# Patient Record
Sex: FEMALE | Race: WHITE | NOT HISPANIC OR LATINO | Employment: OTHER | URBAN - METROPOLITAN AREA
[De-identification: names, ages, dates, MRNs, and addresses within clinical notes are randomized per-mention and may not be internally consistent; named-entity substitution may affect disease eponyms.]

---

## 2017-05-19 ENCOUNTER — COMMUNICATION - HEALTHEAST (OUTPATIENT)
Dept: CARDIOLOGY | Facility: CLINIC | Age: 77
End: 2017-05-19

## 2017-05-19 DIAGNOSIS — I48.0 PAROXYSMAL ATRIAL FIBRILLATION (H): ICD-10-CM

## 2017-05-31 ENCOUNTER — OFFICE VISIT - HEALTHEAST (OUTPATIENT)
Dept: FAMILY MEDICINE | Facility: CLINIC | Age: 77
End: 2017-05-31

## 2017-05-31 DIAGNOSIS — E78.5 HYPERLIPIDEMIA: ICD-10-CM

## 2017-05-31 DIAGNOSIS — M81.0 SENILE OSTEOPOROSIS: ICD-10-CM

## 2017-05-31 DIAGNOSIS — R73.01 IMPAIRED FASTING GLUCOSE: ICD-10-CM

## 2017-05-31 DIAGNOSIS — F41.1 ANXIETY STATE: ICD-10-CM

## 2017-05-31 DIAGNOSIS — Z00.00 MEDICARE ANNUAL WELLNESS VISIT, INITIAL: ICD-10-CM

## 2017-05-31 DIAGNOSIS — I48.0 PAROXYSMAL ATRIAL FIBRILLATION (H): ICD-10-CM

## 2017-05-31 LAB
CHOLEST SERPL-MCNC: 218 MG/DL
FASTING STATUS PATIENT QL REPORTED: YES
HBA1C MFR BLD: 5.4 % (ref 3.5–6)
HDLC SERPL-MCNC: 64 MG/DL
LDLC SERPL CALC-MCNC: 138 MG/DL
TRIGL SERPL-MCNC: 78 MG/DL

## 2017-05-31 ASSESSMENT — MIFFLIN-ST. JEOR: SCORE: 1197.89

## 2017-06-11 ENCOUNTER — COMMUNICATION - HEALTHEAST (OUTPATIENT)
Dept: FAMILY MEDICINE | Facility: CLINIC | Age: 77
End: 2017-06-11

## 2017-06-12 ENCOUNTER — COMMUNICATION - HEALTHEAST (OUTPATIENT)
Dept: FAMILY MEDICINE | Facility: CLINIC | Age: 77
End: 2017-06-12

## 2017-06-12 DIAGNOSIS — M81.0 OSTEOPOROSIS, UNSPECIFIED: ICD-10-CM

## 2017-06-14 ENCOUNTER — AMBULATORY - HEALTHEAST (OUTPATIENT)
Dept: FAMILY MEDICINE | Facility: CLINIC | Age: 77
End: 2017-06-14

## 2017-08-01 ENCOUNTER — HOSPITAL ENCOUNTER (OUTPATIENT)
Dept: MAMMOGRAPHY | Facility: CLINIC | Age: 77
Discharge: HOME | End: 2017-08-01
Attending: FAMILY MEDICINE

## 2017-08-01 DIAGNOSIS — Z12.31 VISIT FOR SCREENING MAMMOGRAM: ICD-10-CM

## 2017-09-19 ENCOUNTER — COMMUNICATION - HEALTHEAST (OUTPATIENT)
Dept: CARDIOLOGY | Facility: CLINIC | Age: 77
End: 2017-09-19

## 2017-09-19 DIAGNOSIS — I48.0 PAROXYSMAL ATRIAL FIBRILLATION (H): ICD-10-CM

## 2017-10-02 ENCOUNTER — OFFICE VISIT - HEALTHEAST (OUTPATIENT)
Dept: CARDIOLOGY | Facility: CLINIC | Age: 77
End: 2017-10-02

## 2017-10-02 DIAGNOSIS — I48.0 PAROXYSMAL ATRIAL FIBRILLATION (H): ICD-10-CM

## 2017-10-02 ASSESSMENT — MIFFLIN-ST. JEOR: SCORE: 1196.99

## 2018-05-01 ENCOUNTER — OFFICE VISIT - HEALTHEAST (OUTPATIENT)
Dept: FAMILY MEDICINE | Facility: CLINIC | Age: 78
End: 2018-05-01

## 2018-05-01 DIAGNOSIS — R30.0 DYSURIA: ICD-10-CM

## 2018-05-01 LAB
ALBUMIN UR-MCNC: ABNORMAL MG/DL
APPEARANCE UR: ABNORMAL
BACTERIA #/AREA URNS HPF: ABNORMAL HPF
BILIRUB UR QL STRIP: NEGATIVE
COLOR UR AUTO: YELLOW
GLUCOSE UR STRIP-MCNC: NEGATIVE MG/DL
HGB UR QL STRIP: ABNORMAL
KETONES UR STRIP-MCNC: ABNORMAL MG/DL
LEUKOCYTE ESTERASE UR QL STRIP: ABNORMAL
MUCOUS THREADS #/AREA URNS LPF: ABNORMAL LPF
NITRATE UR QL: POSITIVE
PH UR STRIP: 5.5 [PH] (ref 5–8)
RBC #/AREA URNS AUTO: ABNORMAL HPF
SP GR UR STRIP: >=1.03 (ref 1–1.03)
SQUAMOUS #/AREA URNS AUTO: ABNORMAL LPF
UROBILINOGEN UR STRIP-ACNC: ABNORMAL
WBC #/AREA URNS AUTO: >100 HPF

## 2018-05-01 ASSESSMENT — MIFFLIN-ST. JEOR: SCORE: 1171.58

## 2018-05-03 LAB — BACTERIA SPEC CULT: ABNORMAL

## 2018-05-17 ENCOUNTER — COMMUNICATION - HEALTHEAST (OUTPATIENT)
Dept: FAMILY MEDICINE | Facility: CLINIC | Age: 78
End: 2018-05-17

## 2018-05-17 ENCOUNTER — OFFICE VISIT - HEALTHEAST (OUTPATIENT)
Dept: FAMILY MEDICINE | Facility: CLINIC | Age: 78
End: 2018-05-17

## 2018-05-17 DIAGNOSIS — R73.01 IMPAIRED FASTING GLUCOSE: ICD-10-CM

## 2018-05-17 DIAGNOSIS — I48.0 PAROXYSMAL ATRIAL FIBRILLATION (H): ICD-10-CM

## 2018-05-17 DIAGNOSIS — M81.0 SENILE OSTEOPOROSIS: ICD-10-CM

## 2018-05-17 DIAGNOSIS — E78.5 HYPERLIPIDEMIA: ICD-10-CM

## 2018-05-17 DIAGNOSIS — M81.0 OSTEOPOROSIS: ICD-10-CM

## 2018-05-17 DIAGNOSIS — Z00.00 MEDICARE ANNUAL WELLNESS VISIT, SUBSEQUENT: ICD-10-CM

## 2018-05-17 DIAGNOSIS — F41.1 ANXIETY STATE: ICD-10-CM

## 2018-05-17 LAB
25(OH)D3 SERPL-MCNC: 38.7 NG/ML (ref 30–80)
CHOLEST SERPL-MCNC: 220 MG/DL
ERYTHROCYTE [DISTWIDTH] IN BLOOD BY AUTOMATED COUNT: 13.3 % (ref 11–14.5)
FASTING STATUS PATIENT QL REPORTED: YES
FASTING STATUS PATIENT QL REPORTED: YES
GLUCOSE BLD-MCNC: 108 MG/DL (ref 70–125)
HBA1C MFR BLD: 5.4 % (ref 3.5–6)
HCT VFR BLD AUTO: 42.3 % (ref 35–47)
HDLC SERPL-MCNC: 73 MG/DL
HGB BLD-MCNC: 14.3 G/DL (ref 12–16)
LDLC SERPL CALC-MCNC: 133 MG/DL
MCH RBC QN AUTO: 30.2 PG (ref 27–34)
MCHC RBC AUTO-ENTMCNC: 33.9 G/DL (ref 32–36)
MCV RBC AUTO: 89 FL (ref 80–100)
PLATELET # BLD AUTO: 179 THOU/UL (ref 140–440)
PMV BLD AUTO: 7.4 FL (ref 7–10)
RBC # BLD AUTO: 4.74 MILL/UL (ref 3.8–5.4)
TRIGL SERPL-MCNC: 68 MG/DL
WBC: 6.1 THOU/UL (ref 4–11)

## 2018-05-17 ASSESSMENT — MIFFLIN-ST. JEOR: SCORE: 1182.81

## 2018-06-07 ENCOUNTER — RECORDS - HEALTHEAST (OUTPATIENT)
Dept: BONE DENSITY | Facility: CLINIC | Age: 78
End: 2018-06-07

## 2018-06-07 ENCOUNTER — RECORDS - HEALTHEAST (OUTPATIENT)
Dept: ADMINISTRATIVE | Facility: OTHER | Age: 78
End: 2018-06-07

## 2018-06-07 DIAGNOSIS — M81.0 AGE-RELATED OSTEOPOROSIS WITHOUT CURRENT PATHOLOGICAL FRACTURE: ICD-10-CM

## 2018-06-16 ENCOUNTER — COMMUNICATION - HEALTHEAST (OUTPATIENT)
Dept: FAMILY MEDICINE | Facility: CLINIC | Age: 78
End: 2018-06-16

## 2018-06-16 DIAGNOSIS — M81.0 SENILE OSTEOPOROSIS: ICD-10-CM

## 2018-08-03 ENCOUNTER — HOSPITAL ENCOUNTER (OUTPATIENT)
Dept: MAMMOGRAPHY | Facility: CLINIC | Age: 78
Discharge: HOME | End: 2018-08-03
Attending: FAMILY MEDICINE

## 2018-08-03 DIAGNOSIS — Z12.31 VISIT FOR SCREENING MAMMOGRAM: ICD-10-CM

## 2018-09-06 ENCOUNTER — OFFICE VISIT - HEALTHEAST (OUTPATIENT)
Dept: INTERNAL MEDICINE | Facility: CLINIC | Age: 78
End: 2018-09-06

## 2018-09-06 DIAGNOSIS — M81.0 AGE-RELATED OSTEOPOROSIS WITHOUT CURRENT PATHOLOGICAL FRACTURE: ICD-10-CM

## 2018-09-06 RX ORDER — MULTIPLE VITAMINS W/ MINERALS TAB 9MG-400MCG
1 TAB ORAL DAILY
Status: SHIPPED | COMMUNITY
Start: 2018-09-06

## 2018-09-06 ASSESSMENT — MIFFLIN-ST. JEOR: SCORE: 1165.23

## 2018-09-10 ENCOUNTER — OFFICE VISIT - HEALTHEAST (OUTPATIENT)
Dept: CARDIOLOGY | Facility: CLINIC | Age: 78
End: 2018-09-10

## 2018-09-10 DIAGNOSIS — I48.0 PAROXYSMAL ATRIAL FIBRILLATION (H): ICD-10-CM

## 2018-09-10 DIAGNOSIS — I10 ESSENTIAL HYPERTENSION: ICD-10-CM

## 2018-09-10 ASSESSMENT — MIFFLIN-ST. JEOR: SCORE: 1165.23

## 2018-09-11 LAB
ATRIAL RATE - MUSE: 122 BPM
DIASTOLIC BLOOD PRESSURE - MUSE: NORMAL MMHG
INTERPRETATION ECG - MUSE: NORMAL
P AXIS - MUSE: NORMAL DEGREES
PR INTERVAL - MUSE: NORMAL MS
QRS DURATION - MUSE: 122 MS
QT - MUSE: 358 MS
QTC - MUSE: 488 MS
R AXIS - MUSE: 91 DEGREES
SYSTOLIC BLOOD PRESSURE - MUSE: NORMAL MMHG
T AXIS - MUSE: -24 DEGREES
VENTRICULAR RATE- MUSE: 112 BPM

## 2018-09-24 ENCOUNTER — HOSPITAL ENCOUNTER (OUTPATIENT)
Dept: CARDIOLOGY | Facility: CLINIC | Age: 78
Discharge: HOME | End: 2018-09-24
Attending: INTERNAL MEDICINE

## 2018-09-24 DIAGNOSIS — I48.0 PAROXYSMAL ATRIAL FIBRILLATION (H): ICD-10-CM

## 2018-09-28 ENCOUNTER — COMMUNICATION - HEALTHEAST (OUTPATIENT)
Dept: CARDIOLOGY | Facility: CLINIC | Age: 78
End: 2018-09-28

## 2019-01-16 ENCOUNTER — COMMUNICATION - HEALTHEAST (OUTPATIENT)
Dept: ADMINISTRATIVE | Facility: CLINIC | Age: 79
End: 2019-01-16

## 2019-05-16 ENCOUNTER — OFFICE VISIT - HEALTHEAST (OUTPATIENT)
Dept: FAMILY MEDICINE | Facility: CLINIC | Age: 79
End: 2019-05-16

## 2019-05-16 DIAGNOSIS — R73.01 IMPAIRED FASTING GLUCOSE: ICD-10-CM

## 2019-05-16 DIAGNOSIS — M81.0 SENILE OSTEOPOROSIS: ICD-10-CM

## 2019-05-16 DIAGNOSIS — E78.5 HYPERLIPIDEMIA, UNSPECIFIED HYPERLIPIDEMIA TYPE: ICD-10-CM

## 2019-05-16 DIAGNOSIS — Z00.00 MEDICARE ANNUAL WELLNESS VISIT, SUBSEQUENT: ICD-10-CM

## 2019-05-16 DIAGNOSIS — F41.1 ANXIETY STATE: ICD-10-CM

## 2019-05-16 DIAGNOSIS — I48.0 PAROXYSMAL ATRIAL FIBRILLATION (H): ICD-10-CM

## 2019-05-16 LAB
CHOLEST SERPL-MCNC: 209 MG/DL
FASTING STATUS PATIENT QL REPORTED: YES
FASTING STATUS PATIENT QL REPORTED: YES
GLUCOSE BLD-MCNC: 85 MG/DL (ref 70–125)
HBA1C MFR BLD: 5.6 % (ref 3.5–6)
HDLC SERPL-MCNC: 65 MG/DL
LDLC SERPL CALC-MCNC: 128 MG/DL
TRIGL SERPL-MCNC: 79 MG/DL

## 2019-05-16 ASSESSMENT — MIFFLIN-ST. JEOR: SCORE: 1143.13

## 2019-05-17 LAB
25(OH)D3 SERPL-MCNC: 47.4 NG/ML (ref 30–80)
25(OH)D3 SERPL-MCNC: 47.4 NG/ML (ref 30–80)

## 2019-06-11 ENCOUNTER — OFFICE VISIT - HEALTHEAST (OUTPATIENT)
Dept: CARDIOLOGY | Facility: CLINIC | Age: 79
End: 2019-06-11

## 2019-06-11 DIAGNOSIS — I48.0 PAROXYSMAL ATRIAL FIBRILLATION (H): ICD-10-CM

## 2019-06-11 ASSESSMENT — MIFFLIN-ST. JEOR: SCORE: 1152.2

## 2019-06-24 ENCOUNTER — COMMUNICATION - HEALTHEAST (OUTPATIENT)
Dept: FAMILY MEDICINE | Facility: CLINIC | Age: 79
End: 2019-06-24

## 2020-06-09 ENCOUNTER — COMMUNICATION - HEALTHEAST (OUTPATIENT)
Dept: CARDIOLOGY | Facility: CLINIC | Age: 80
End: 2020-06-09

## 2020-06-09 DIAGNOSIS — I48.0 PAROXYSMAL ATRIAL FIBRILLATION (H): ICD-10-CM

## 2020-06-23 ENCOUNTER — OFFICE VISIT - HEALTHEAST (OUTPATIENT)
Dept: CARDIOLOGY | Facility: CLINIC | Age: 80
End: 2020-06-23

## 2020-06-23 DIAGNOSIS — I10 ESSENTIAL HYPERTENSION: ICD-10-CM

## 2020-06-23 DIAGNOSIS — I48.0 PAROXYSMAL ATRIAL FIBRILLATION (H): ICD-10-CM

## 2020-11-19 ENCOUNTER — COMMUNICATION - HEALTHEAST (OUTPATIENT)
Dept: SCHEDULING | Facility: CLINIC | Age: 80
End: 2020-11-19

## 2021-04-05 ENCOUNTER — AMBULATORY - HEALTHEAST (OUTPATIENT)
Dept: NURSING | Facility: CLINIC | Age: 81
End: 2021-04-05

## 2021-04-07 ENCOUNTER — OFFICE VISIT - HEALTHEAST (OUTPATIENT)
Dept: FAMILY MEDICINE | Facility: CLINIC | Age: 81
End: 2021-04-07

## 2021-04-07 DIAGNOSIS — R35.0 URINARY FREQUENCY: ICD-10-CM

## 2021-04-07 DIAGNOSIS — Z11.59 ENCOUNTER FOR HCV SCREENING TEST FOR LOW RISK PATIENT: ICD-10-CM

## 2021-04-07 DIAGNOSIS — I48.0 PAROXYSMAL ATRIAL FIBRILLATION (H): ICD-10-CM

## 2021-04-07 DIAGNOSIS — Z12.31 VISIT FOR SCREENING MAMMOGRAM: ICD-10-CM

## 2021-04-07 DIAGNOSIS — M81.0 SENILE OSTEOPOROSIS: ICD-10-CM

## 2021-04-07 DIAGNOSIS — F41.1 ANXIETY STATE: ICD-10-CM

## 2021-04-07 DIAGNOSIS — R73.01 IMPAIRED FASTING GLUCOSE: ICD-10-CM

## 2021-04-07 DIAGNOSIS — Z00.00 MEDICARE ANNUAL WELLNESS VISIT, SUBSEQUENT: ICD-10-CM

## 2021-04-07 DIAGNOSIS — E78.5 HYPERLIPIDEMIA, UNSPECIFIED HYPERLIPIDEMIA TYPE: ICD-10-CM

## 2021-04-07 LAB
ALBUMIN UR-MCNC: ABNORMAL G/DL
ANION GAP SERPL CALCULATED.3IONS-SCNC: 11 MMOL/L (ref 5–18)
APPEARANCE UR: CLEAR
BACTERIA #/AREA URNS HPF: ABNORMAL /[HPF]
BILIRUB UR QL STRIP: NEGATIVE
BUN SERPL-MCNC: 16 MG/DL (ref 8–28)
CALCIUM SERPL-MCNC: 9 MG/DL (ref 8.5–10.5)
CHLORIDE BLD-SCNC: 104 MMOL/L (ref 98–107)
CHOLEST SERPL-MCNC: 191 MG/DL
CO2 SERPL-SCNC: 26 MMOL/L (ref 22–31)
COLOR UR AUTO: YELLOW
CREAT SERPL-MCNC: 0.83 MG/DL (ref 0.6–1.1)
ERYTHROCYTE [DISTWIDTH] IN BLOOD BY AUTOMATED COUNT: 13.2 % (ref 11–14.5)
FASTING STATUS PATIENT QL REPORTED: YES
GFR SERPL CREATININE-BSD FRML MDRD: >60 ML/MIN/1.73M2
GLUCOSE BLD-MCNC: 119 MG/DL (ref 70–125)
GLUCOSE UR STRIP-MCNC: NEGATIVE MG/DL
HBA1C MFR BLD: 4.9 %
HCT VFR BLD AUTO: 43.7 % (ref 35–47)
HDLC SERPL-MCNC: 64 MG/DL
HGB BLD-MCNC: 14.7 G/DL (ref 12–16)
HGB UR QL STRIP: ABNORMAL
KETONES UR STRIP-MCNC: ABNORMAL MG/DL
LDLC SERPL CALC-MCNC: 112 MG/DL
LEUKOCYTE ESTERASE UR QL STRIP: ABNORMAL
MCH RBC QN AUTO: 30.2 PG (ref 27–34)
MCHC RBC AUTO-ENTMCNC: 33.6 G/DL (ref 32–36)
MCV RBC AUTO: 90 FL (ref 80–100)
NITRATE UR QL: NEGATIVE
PH UR STRIP: 5.5 [PH] (ref 5–8)
PLATELET # BLD AUTO: 177 THOU/UL (ref 140–440)
PMV BLD AUTO: 10.1 FL (ref 7–10)
POTASSIUM BLD-SCNC: 4.3 MMOL/L (ref 3.5–5)
RBC # BLD AUTO: 4.86 MILL/UL (ref 3.8–5.4)
RBC #/AREA URNS AUTO: ABNORMAL HPF
SODIUM SERPL-SCNC: 141 MMOL/L (ref 136–145)
SP GR UR STRIP: >=1.03 (ref 1–1.03)
SQUAMOUS #/AREA URNS AUTO: ABNORMAL LPF
TRIGL SERPL-MCNC: 76 MG/DL
UROBILINOGEN UR STRIP-ACNC: ABNORMAL
WBC #/AREA URNS AUTO: ABNORMAL HPF
WBC: 5.9 THOU/UL (ref 4–11)

## 2021-04-07 ASSESSMENT — MIFFLIN-ST. JEOR: SCORE: 1191.88

## 2021-04-08 LAB
25(OH)D3 SERPL-MCNC: 47.4 NG/ML (ref 30–80)
25(OH)D3 SERPL-MCNC: 47.4 NG/ML (ref 30–80)
BACTERIA SPEC CULT: NO GROWTH
HCV AB SERPL QL IA: NEGATIVE

## 2021-04-26 ENCOUNTER — AMBULATORY - HEALTHEAST (OUTPATIENT)
Dept: NURSING | Facility: CLINIC | Age: 81
End: 2021-04-26

## 2021-05-24 ENCOUNTER — OFFICE VISIT - HEALTHEAST (OUTPATIENT)
Dept: CARDIOLOGY | Facility: CLINIC | Age: 81
End: 2021-05-24

## 2021-05-24 ENCOUNTER — COMMUNICATION - HEALTHEAST (OUTPATIENT)
Dept: CARDIOLOGY | Facility: CLINIC | Age: 81
End: 2021-05-24

## 2021-05-24 DIAGNOSIS — I10 ESSENTIAL HYPERTENSION: ICD-10-CM

## 2021-05-24 DIAGNOSIS — I48.91 ATRIAL FIBRILLATION WITH RAPID VENTRICULAR RESPONSE (H): ICD-10-CM

## 2021-05-24 DIAGNOSIS — I48.0 PAROXYSMAL ATRIAL FIBRILLATION (H): ICD-10-CM

## 2021-05-24 DIAGNOSIS — I35.1 NONRHEUMATIC AORTIC VALVE INSUFFICIENCY: ICD-10-CM

## 2021-05-24 LAB
ATRIAL RATE - MUSE: 100 BPM
DIASTOLIC BLOOD PRESSURE - MUSE: NORMAL
INTERPRETATION ECG - MUSE: NORMAL
P AXIS - MUSE: NORMAL
PR INTERVAL - MUSE: NORMAL
QRS DURATION - MUSE: 126 MS
QT - MUSE: 358 MS
QTC - MUSE: 501 MS
R AXIS - MUSE: 86 DEGREES
SYSTOLIC BLOOD PRESSURE - MUSE: NORMAL
T AXIS - MUSE: -43 DEGREES
VENTRICULAR RATE- MUSE: 118 BPM

## 2021-05-24 RX ORDER — METOPROLOL SUCCINATE 200 MG/1
200 TABLET, EXTENDED RELEASE ORAL DAILY
Qty: 90 TABLET | Refills: 3 | Status: SHIPPED | OUTPATIENT
Start: 2021-05-24 | End: 2022-05-19

## 2021-05-24 ASSESSMENT — MIFFLIN-ST. JEOR: SCORE: 1187.35

## 2021-05-25 ENCOUNTER — HOSPITAL ENCOUNTER (OUTPATIENT)
Dept: CARDIOLOGY | Facility: CLINIC | Age: 81
Discharge: HOME | End: 2021-05-25
Attending: INTERNAL MEDICINE

## 2021-05-25 DIAGNOSIS — I48.0 PAROXYSMAL ATRIAL FIBRILLATION (H): ICD-10-CM

## 2021-05-25 LAB
AORTIC ROOT: 3.6 CM
AR DECEL SLOPE: 3840 MM/S2
AR PEAK VELOCITY: 634 CM/S
ASCENDING AORTA: 3.7 CM
AV REGURGITANT PEAK GRADIENT: 160.8 MMHG
AV REGURGITATION PRESSURE HALF TIME: 799 MS
BSA FOR ECHO PROCEDURE: 1.77 M2
CV BLOOD PRESSURE: ABNORMAL MMHG
CV ECHO HEIGHT: 67 IN
CV ECHO WEIGHT: 147 LBS
DOP CALC LVOT AREA: 2.83 CM2
DOP CALC LVOT DIAMETER: 1.9 CM
DOP CALC LVOT PEAK VEL: 83.5 CM/S
DOP CALC LVOT STROKE VOLUME: 38.3 CM3
DOP CALCLVOT PEAK VEL VTI: 13.5 CM
EJECTION FRACTION: 44 % (ref 55–75)
FRACTIONAL SHORTENING: 37 % (ref 28–44)
INTERVENTRICULAR SEPTUM IN END DIASTOLE: 1 CM (ref 0.6–0.9)
IVS/PW RATIO: 1
LA AREA 1: 18.3 CM2
LA AREA 2: 23 CM2
LEFT ATRIUM LENGTH: 5.03 CM
LEFT ATRIUM SIZE: 4.2 CM
LEFT ATRIUM TO AORTIC ROOT RATIO: 1.17 NO UNITS
LEFT ATRIUM VOLUME INDEX: 40.2 ML/M2
LEFT ATRIUM VOLUME: 71.1 ML
LEFT VENTRICLE CARDIAC INDEX: 1.8 L/MIN/M2
LEFT VENTRICLE CARDIAC OUTPUT: 3.2 L/MIN
LEFT VENTRICLE DIASTOLIC VOLUME INDEX: 29.4 CM3/M2 (ref 29–61)
LEFT VENTRICLE DIASTOLIC VOLUME: 52 CM3 (ref 46–106)
LEFT VENTRICLE HEART RATE: 84 BPM
LEFT VENTRICLE MASS INDEX: 89.7 G/M2
LEFT VENTRICLE SYSTOLIC VOLUME INDEX: 16.4 CM3/M2 (ref 8–24)
LEFT VENTRICLE SYSTOLIC VOLUME: 29 CM3 (ref 14–42)
LEFT VENTRICULAR INTERNAL DIMENSION IN DIASTOLE: 4.6 CM (ref 3.8–5.2)
LEFT VENTRICULAR INTERNAL DIMENSION IN SYSTOLE: 2.9 CM (ref 2.2–3.5)
LEFT VENTRICULAR MASS: 158.8 G
LEFT VENTRICULAR OUTFLOW TRACT MEAN GRADIENT: 1 MMHG
LEFT VENTRICULAR OUTFLOW TRACT MEAN VELOCITY: 54 CM/S
LEFT VENTRICULAR OUTFLOW TRACT PEAK GRADIENT: 3 MMHG
LEFT VENTRICULAR POSTERIOR WALL IN END DIASTOLE: 1 CM (ref 0.6–0.9)
LV STROKE VOLUME INDEX: 21.6 ML/M2
MR PEAK GRADIENT: 101.2 MMHG
MV AVERAGE E/E' RATIO: 8.6 CM/S
MV DECELERATION TIME: 148 MS
MV E'TISSUE VEL-LAT: 11 CM/S
MV E'TISSUE VEL-MED: 7.55 CM/S
MV LATERAL E/E' RATIO: 7.2
MV MEDIAL E/E' RATIO: 10.5
MV PEAK E VELOCITY: 79.5 CM/S
NUC REST DIASTOLIC VOLUME INDEX: 2352 LBS
NUC REST SYSTOLIC VOLUME INDEX: 67 IN
PISA MR PEAK VEL: 503 CM/S
PR MAX PG: 10 MMHG
PR PEAK VELOCITY: 170 CM/S
PV ACCELERATION TIME: 63 MS
TRICUSPID REGURGITATION PEAK PRESSURE GRADIENT: 23 MMHG
TRICUSPID VALVE ANULAR PLANE SYSTOLIC EXCURSION: 2.4 CM
TRICUSPID VALVE PEAK REGURGITANT VELOCITY: 240 CM/S

## 2021-05-25 ASSESSMENT — MIFFLIN-ST. JEOR: SCORE: 1179.42

## 2021-05-28 NOTE — PROGRESS NOTES
Assessment and Plan:     1. Medicare annual wellness visit, subsequent  At today's visit, we discussed lifestyle interventions to promote self-management and wellness, including maintenance of a healthy weight, healthy diet, regular physical activity and exercise, and falls prevention.  Discussed Shingrix, she will consider.  Advanced healthcare directive at home, advised that she forward this to us.  Reviewed option of mammograms, she anticipates continuing yearly.  Up-to-date with colon cancer screening, needing no further evaluation.  Will obtain fasting lipids and fasting glucose today.    2. Paroxysmal atrial fibrillation (H)  Rate control remains borderline.  We discussed options, at this time she would prefer to wait to follow-up with cardiology.  I advised that she check her heart rate intermittently between now and then.  She declines additional Holter monitor at this time.  She will continue metoprolol at 150 mg daily.  Remains anticoagulant with Xarelto.    3. Impaired fasting glucose  Encourage efforts at healthy lifestyle habits.  Will obtain fasting lipids and fasting glucose today.  - Glycosylated Hemoglobin A1c  - Glucose    4. Hyperlipidemia, unspecified hyperlipidemia type  Encouraged healthy lifestyle habits.  Will obtain fasting lipids in monitoring of her mild dyslipidemia.  - Lipid Cascade FASTING    5. Anxiety  Doing quite well with coping measures.  She has good insight.  No further evaluation or treatment needed.    6. Postmenopausal Osteoporosis  Currently on a holiday from alendronate.  Follow-up bone density scan will be due next year, if needed will plan to treat with Prolia.  - Vitamin D, Total (25-Hydroxy)     The patient's current medical problems were reviewed.    The following health maintenance schedule was reviewed with the patient and provided in printed form in the after visit summary:   Health Maintenance   Topic Date Due     ZOSTER VACCINES (2 of 3) 02/12/2008     FALL RISK  ASSESSMENT  05/17/2019     INFLUENZA VACCINE RULE BASED (Season Ended) 08/01/2019     DXA SCAN  06/07/2020     ADVANCE DIRECTIVES DISCUSSED WITH PATIENT  05/17/2023     TD 18+ HE  05/22/2025     PNEUMOCOCCAL POLYSACCHARIDE VACCINE AGE 65 AND OVER  Completed     PNEUMOCOCCAL CONJUGATE VACCINE FOR ADULTS (PCV13 OR PREVNAR)  Completed        Subjective:   Chief Complaint: Hector Gill is an 78 y.o. female here for an Annual Wellness visit.   HPI: She is been doing quite well over the past year and has no particular concerns today.  She notes that about a week ago she fell onto her right knee when her shoe was caught, has had some swelling and stiffness that loosens up but she continues to walk, no changes in her gait, and feels that it is resolving.  No further evaluation is requested today.  Known history of osteoporosis, had taken alendronate, followed by Dr. Benson who recommended that she remain on alendronate holiday with follow-up bone density scan next year.  History of anxiety that has not required any medications, does better with distraction or staying busy.  History of impaired fasting glucose and mild dyslipidemia for which she is fasting for follow-up today.  History of paroxysmal atrial fibrillation, most recently has been in atrial fibrillation consistently.  Remains on metoprolol 150 mg daily, this was increased last fall, remains on Xarelto for anticoagulation.  Denies any palpitations, lightheadedness, dizziness, shortness of breath, or changes in activity tolerance.  No edema.    [facility] , [place] [facility] [place]  [place] in [place].    Review of Systems:   Please see above.  The rest of the review of systems are negative for all systems.    Patient Care Team:  Celeste Jackson MD as PCP - General  Anirudh Curiel MD as Physician (Cardiology)     Patient Active Problem List   Diagnosis     Hyperlipidemia     Impaired Fasting Glucose      Anxiety     Postmenopausal Osteoporosis     Paroxysmal atrial fibrillation (H)     Past Medical History:   Diagnosis Date     Inverted nipple       Past Surgical History:   Procedure Laterality Date     RI APPENDECTOMY      Description: Appendectomy;  Recorded: 12/18/2007;     RI REMOVAL OF TONSILS,<13 Y/O      Description: Tonsillectomy;  Recorded: 12/18/2007;      Family History   Problem Relation Age of Onset     Hypertension Father       Social History     Socioeconomic History     Marital status:      Spouse name: Not on file     Number of children: Not on file     Years of education: Not on file     Highest education level: Not on file   Occupational History     Not on file   Social Needs     Financial resource strain: Not on file     Food insecurity:     Worry: Not on file     Inability: Not on file     Transportation needs:     Medical: Not on file     Non-medical: Not on file   Tobacco Use     Smoking status: Former Smoker     Smokeless tobacco: Never Used   Substance and Sexual Activity     Alcohol use: Not on file     Drug use: Not on file     Sexual activity: Not on file   Lifestyle     Physical activity:     Days per week: Not on file     Minutes per session: Not on file     Stress: Not on file   Relationships     Social connections:     Talks on phone: Not on file     Gets together: Not on file     Attends [employer] service: Not on file     Active member of club or organization: Not on file     Attends meetings of clubs or organizations: Not on file     Relationship status: Not on file     Intimate partner violence:     Fear of current or ex partner: Not on file     Emotionally abused: Not on file     Physically abused: Not on file     Forced sexual activity: Not on file   Other Topics Concern     Not on file   Social History Narrative     Not on file      Current Outpatient Medications   Medication Sig Dispense Refill     cholecalciferol, vitamin D3, (VITAMIN D3) 2,000 unit capsule Take  "1,000 Units by mouth daily.       coenzyme Q10 (CO Q-10) 10 mg capsule Take 70 mg by mouth daily.       metoprolol succinate (TOPROL XL) 100 MG 24 hr tablet Take 1.5 tablets (150 mg total) by mouth daily. 135 tablet 3     multivitamin with minerals (THERA-M) 9 mg iron-400 mcg Tab tablet Take 1 tablet by mouth daily.       omega-3s/dha/epa/fish oil (OMEGA 3 ORAL) Take by mouth daily.       rivaroxaban (XARELTO) 20 mg Tab Take 1 tablet (20 mg total) by mouth daily with supper. 90 tablet 3     No current facility-administered medications for this visit.       Objective:   Vital Signs:   Visit Vitals  /76   Pulse (!) 102   Temp 98.8  F (37.1  C) (Oral)   Resp 20   Ht 5' 7\" (1.702 m)   Wt 139 lb (63 kg)   LMP 08/03/1990   SpO2 96%   BMI 21.77 kg/m         VisionScreening:  No exam data present     PHYSICAL EXAM  Physical Examination: General appearance - alert, well appearing, and in no distress, oriented to person, place, and time and normal appearing weight  Mental status - alert, oriented to person, place, and time, normal mood, behavior, speech, dress, motor activity, and thought processes  Eyes - pupils equal and reactive, extraocular eye movements intact  Ears - bilateral TM's and external ear canals normal  Nose - normal and patent, no erythema, discharge or polyps  Mouth - mucous membranes moist, pharynx normal without lesions  Neck - supple, no significant adenopathy  Lymphatics - no palpable lymphadenopathy, no hepatosplenomegaly  Chest - clear to auscultation, no wheezes, rales or rhonchi, symmetric air entry  Heart - normal rate, regular rhythm, normal S1, S2, no murmurs, rubs, clicks or gallops  Abdomen - soft, nontender, nondistended, no masses or organomegaly  Breasts - breasts appear normal, no suspicious masses, no skin or nipple changes or axillary nodes  Neurological - alert, oriented, normal speech, no focal findings or movement disorder noted  Musculoskeletal - no joint tenderness, deformity or " swelling  Extremities - peripheral pulses normal, no pedal edema, no clubbing or cyanosis  Skin - normal coloration and turgor, no rashes, no suspicious skin lesions noted      Assessment Results 5/16/2019   Activities of Daily Living No help needed   Instrumental Activities of Daily Living No help needed   Mini Cog Total Score 5   Some recent data might be hidden     A Mini-Cog score of 0-2 suggests the possibility of dementia, score of 3-5 suggests no dementia    Identified Health Risks:     She is at risk for lack of exercise and has been provided with information to increase physical activity for the benefit of her well-being.  The patient was counseled and encouraged to consider modifying their diet and eating habits. She was provided with information on recommended healthy diet options.  Patient's advanced directive was discussed and I am comfortable with the patient's wishes.

## 2021-05-29 ENCOUNTER — RECORDS - HEALTHEAST (OUTPATIENT)
Dept: ADMINISTRATIVE | Facility: CLINIC | Age: 81
End: 2021-05-29

## 2021-05-29 NOTE — TELEPHONE ENCOUNTER
Who is requesting the letter?  Patient  Why is the letter needed? Patient is requesting a Results Letter for labs done on 5/16/19.  How would you like this letter returned? Please mail to patients address on file.  Okay to leave a detailed message? Yes

## 2021-05-30 ENCOUNTER — RECORDS - HEALTHEAST (OUTPATIENT)
Dept: ADMINISTRATIVE | Facility: CLINIC | Age: 81
End: 2021-05-30

## 2021-05-31 VITALS — BODY MASS INDEX: 23.57 KG/M2 | WEIGHT: 150.2 LBS | HEIGHT: 67 IN

## 2021-05-31 VITALS — BODY MASS INDEX: 23.54 KG/M2 | HEIGHT: 67 IN | WEIGHT: 150 LBS

## 2021-06-01 VITALS — BODY MASS INDEX: 22.66 KG/M2 | HEIGHT: 67 IN | WEIGHT: 144.4 LBS

## 2021-06-01 VITALS — BODY MASS INDEX: 22.13 KG/M2 | HEIGHT: 68 IN | WEIGHT: 146 LBS

## 2021-06-02 VITALS — BODY MASS INDEX: 22.44 KG/M2 | HEIGHT: 67 IN | WEIGHT: 143 LBS

## 2021-06-02 VITALS — WEIGHT: 143 LBS | HEIGHT: 67 IN | BODY MASS INDEX: 22.44 KG/M2

## 2021-06-03 VITALS — WEIGHT: 139 LBS | BODY MASS INDEX: 21.82 KG/M2 | HEIGHT: 67 IN

## 2021-06-03 VITALS — BODY MASS INDEX: 22.13 KG/M2 | WEIGHT: 141 LBS | HEIGHT: 67 IN

## 2021-06-04 VITALS — BODY MASS INDEX: 23.96 KG/M2 | WEIGHT: 153 LBS

## 2021-06-05 VITALS
RESPIRATION RATE: 20 BRPM | BODY MASS INDEX: 22.43 KG/M2 | DIASTOLIC BLOOD PRESSURE: 82 MMHG | TEMPERATURE: 98 F | WEIGHT: 148 LBS | HEIGHT: 68 IN | OXYGEN SATURATION: 98 % | SYSTOLIC BLOOD PRESSURE: 134 MMHG | HEART RATE: 68 BPM

## 2021-06-07 ENCOUNTER — HOSPITAL ENCOUNTER (OUTPATIENT)
Dept: CARDIOLOGY | Facility: CLINIC | Age: 81
Discharge: HOME | End: 2021-06-07
Attending: INTERNAL MEDICINE

## 2021-06-07 DIAGNOSIS — I48.0 PAROXYSMAL ATRIAL FIBRILLATION (H): ICD-10-CM

## 2021-06-08 NOTE — TELEPHONE ENCOUNTER
----- Message from Charity Pink sent at 6/9/2020  9:54 AM CDT -----  Patient has already contacted their pharmacy. The medication or refill issue is below:    Primary Cardiologist: Dr. Jones  Medication: 1.  Metoprolol, and 2. Xaralto   Issue / Concern: Needs refills please    Preferred Pharmacy/City: [facility]   [facility] Phone Number for Patient: [phone]    Additional Info:

## 2021-06-10 NOTE — PROGRESS NOTES
Assessment and Plan:     1. Medicare annual wellness visit, initial  At today's visit, we discussed lifestyle interventions to promote self-management and wellness, including maintenance of a healthy weight, healthy diet, regular physical activity and exercise, and falls prevention.  Reviewed routine cancer screening, up-to-date with this.  Up-to-date with bone density screening.  Will obtain fasting lipids today, known history of hyperlipidemia.  She has an advanced healthcare directive, I advised that she send us a copy.    2. Paroxysmal atrial fibrillation  Rate controlled.  Anticoagulated with Xarelto, will obtain hemogram and follow-up.  She will continue to follow with cardiology.  - HM2(CBC w/o Differential)    3. Anxiety  Controlled without need for medication.    4. Hyperlipidemia  She will continue to work on healthy lifestyle habits.  Will check fasting lipid cascade today.  - Comprehensive Metabolic Panel  - Lipid Cascade FASTING    5. Impaired fasting glucose  Encourage efforts at healthy lifestyle habits.  Will obtain follow-up A1c and fasting glucose today.  - Glycosylated Hemoglobin A1c    6. Postmenopausal Osteoporosis  Will obtain hemogram and vitamin D level and follow-up.  She remains on alendronate, having restarted that May 2015.  She will be due for follow-up bone density scan next year.  - HM2(CBC w/o Differential)  - Vitamin D, Total (25-Hydroxy)      The patient's current medical problems were reviewed.    I have had an Advance Directives discussion with the patient.  The following health maintenance schedule was reviewed with the patient and provided in printed form in the after visit summary:   Health Maintenance   Topic Date Due     ADVANCE DIRECTIVES DISCUSSED WITH PATIENT  12/27/1958     FALL RISK ASSESSMENT  12/27/2007     INFLUENZA VACCINE RULE BASED (Season Ended) 08/01/2017     MAMMOGRAM  06/27/2018     DXA SCAN  06/27/2018     TD 18+ HE  05/22/2025     COLONOSCOPY  07/14/2025  "    PNEUMOCOCCAL POLYSACCHARIDE VACCINE AGE 65 AND OVER  Completed     PNEUMOCOCCAL CONJUGATE VACCINE FOR ADULTS (PCV13 OR PREVNAR)  Completed     ZOSTER VACCINE  Completed        Subjective:   Chief Complaint: Hector Gill is an 76 y.o. female here for an Annual Wellness visit.   HPI: She is been doing well over the past year without significant changes in her health.  History of atrial fibrillation for which she is rate controlled with metoprolol and taking Xarelto for stroke prevention.  History of impaired fasting glucose, due for follow-up.  She has a history of anxiety but is not needing any medications, does well if she \"stays busy\".  History of hyperlipidemia, fasting for follow-up of this.  She has history of osteoporosis.  Had completed a previous 5 year course of alendronate, we restarted alendronate May 2015.  She will be due for bone density scan next year.    She is  to Yunier.  They spend 6 weeks in [place] in February and March.  They have a dog named Paula.  1 great granddaughter.  She does not smoke.  2 alcoholic beverages per week, usually beer.  Denies use of tobacco.  She wears a seatbelt.  She does not ride a bike, considering doing so and I encouraged helmets if choosing to do so.  She has her eyes checked regularly.  She is seeing a dentist regularly.  She is wearing sunscreen.    Review of Systems:  Please see above.  The rest of the review of systems are negative for all systems.    Patient Care Team:  Celeste Jackson MD as PCP - General  Anirudh Curiel MD as Physician (Cardiology)     Patient Active Problem List   Diagnosis     Hyperlipidemia     Impaired Fasting Glucose     Anxiety     Postmenopausal Osteoporosis     Paroxysmal atrial fibrillation     Past Medical History:   Diagnosis Date     Inverted nipple       Past Surgical History:   Procedure Laterality Date     MO APPENDECTOMY      Description: Appendectomy;  Recorded: 12/18/2007;     MO REMOVAL OF " "TONSILS,<13 Y/O      Description: Tonsillectomy;  Recorded: 12/18/2007;      Family History   Problem Relation Age of Onset     Hypertension Father       Social History     Social History     Marital status:      Spouse name: N/A     Number of children: N/A     Years of education: N/A     Occupational History     Not on file.     Social History Main Topics     Smoking status: Former Smoker     Smokeless tobacco: Not on file     Alcohol use Not on file     Drug use: Not on file     Sexual activity: Not on file     Other Topics Concern     Not on file     Social History Narrative      Current Outpatient Prescriptions   Medication Sig Dispense Refill     alendronate (FOSAMAX) 70 MG tablet Take 70 mg by mouth every 7 days. Take in the morning on an empty stomach with a full glass of water 30 minutes before food       metoprolol succinate (TOPROL XL) 100 MG 24 hr tablet Take 1 tablet (100 mg total) by mouth daily. 90 tablet 3     XARELTO 20 mg Tab TAKE 1 TABLET BY MOUTH EVERY DAY 90 tablet 1     No current facility-administered medications for this visit.       Objective:   Vital Signs:   Visit Vitals     /78 (Patient Site: Left Arm, Patient Position: Sitting, Cuff Size: Adult Regular)     Pulse 80     Temp 97.6  F (36.4  C) (Oral)     Resp 20     Ht 5' 7.25\" (1.708 m)     Wt 150 lb 3.2 oz (68.1 kg)     SpO2 98%     BMI 23.35 kg/m2        VisionScreening:  No exam data present     PHYSICAL EXAM  Physical Examination: General appearance - alert, well appearing, and in no distress, oriented to person, place, and time and normal appearing weight  Mental status - alert, oriented to person, place, and time, normal mood, behavior, speech, dress, motor activity, and thought processes  Eyes - pupils equal and reactive, extraocular eye movements intact  Ears - bilateral TM's and external ear canals normal  Nose - normal and patent, no erythema, discharge or polyps  Mouth - mucous membranes moist, pharynx normal " without lesions  Neck - supple, no significant adenopathy  Lymphatics - no palpable lymphadenopathy, no hepatosplenomegaly  Chest - clear to auscultation, no wheezes, rales or rhonchi, symmetric air entry  Heart - normal rate, regular rhythm, normal S1, S2, no murmurs, rubs, clicks or gallops  Abdomen - soft, nontender, nondistended, no masses or organomegaly  Extremities - peripheral pulses normal, no pedal edema, no clubbing or cyanosis  Skin - normal coloration and turgor, no rashes, no suspicious skin lesions noted      Assessment Results 5/31/2017   Activities of Daily Living No help needed   Instrumental Activities of Daily Living No help needed     A Mini-Cog score of 0-2 suggests the possibility of dementia, score of 3-5 suggests no dementia    Identified Health Risks:     She is at risk for lack of exercise and has been provided with information to increase physical activity for the benefit of her well-being.  The patient was counseled and encouraged to consider modifying their diet and eating habits. She was provided with information on recommended healthy diet options.  Patient's advanced directive was discussed and I am comfortable with the patient's wishes.

## 2021-06-13 NOTE — TELEPHONE ENCOUNTER
11/19/20    Call Regarding Other Annual Wellness Visit    Attempt 1    Message on voicemail    Comments:       Outreach     Ping TERRY

## 2021-06-15 PROBLEM — I48.0 PAROXYSMAL ATRIAL FIBRILLATION (H): Status: ACTIVE | Noted: 2017-05-31

## 2021-06-16 NOTE — PROGRESS NOTES
Assessment and Plan:     1. Medicare annual wellness visit, subsequent  At today's visit, we discussed lifestyle interventions to promote self-management and wellness, including maintenance of a healthy weight, healthy diet, regular physical activity and exercise, and falls prevention. Order placed for screening mammogram (after discussing risk vs. Benefit) and DEXA.  No longer in need of colonoscopy.  Immunizations up to date.  Consider Shingrix.    2. Impaired fasting glucose  Encourage healthy lifestyle habits. Will obtain A1C and fasting glucose in BMP for follow-up.  - Basic Metabolic Panel  - Glycosylated Hemoglobin A1c    3. Hyperlipidemia, unspecified hyperlipidemia type  Encouraged healthy lifestyle habits.  She is not interested in medication for this.  Will obtain lipid cascade today.  - Lipid Cascade FASTING    4. Paroxysmal atrial fibrillation (H)  Controlled rate and rhythem with Toprol XL; anticoagulated with Xarelto.  Will check BMP and hemogram today in monitoring of Xarelto.  - Basic Metabolic Panel  - HM2(CBC w/o Differential)    5. Postmenopausal Osteoporosis  Encouraged weight bearing exercise, adequate calcium and vitamin D intake, and measures to reduce risk of falling.  She is on an alendronate holiday, will schedule for a follow-up DEXA.  Will check vitamin D level today to ensure sufficiency.  - Vitamin D, Total (25-Hydroxy)  - DXA Bone Density Scan; Future    6. Anxiety  Adequately controlled without the need for medication.    7. Urinary frequency  Increase from baseline chronic frequency without dysuria or hematuria.  UA is borderline.  Will await culture.  - Urinalysis-UC if Indicated  - Culture, Urine    8. Encounter for HCV screening test for low risk patient  Will obtain screening Hepatitis C antibody today.  - Hepatitis C Antibody (Anti-HCV)    9. Visit for screening mammogram  - Mammo Screening Bilateral; Future     The patient's current medical problems were reviewed.    The  following health maintenance schedule was reviewed with the patient and provided in printed form in the after visit summary:   Health Maintenance Due   Topic Date Due     ZOSTER VACCINES (2 of 3) 02/12/2008        Subjective:   Chief Complaint: Hector Gill is an 80 y.o. female here for an Annual Wellness visit.   HPI: She has been doing well over the past year.  Fasting today, due for follow-up of impaired fasting glucose and dyslipidemia, takes no medications for either leg coenzyme Q 10.  History of paroxysmal atrial fibrillation that is currently suppressed without symptoms with Toprol-XL, also taking Xarelto.  Followed by Dr. Curiel of cardiology.  Currently on alendronate holiday from for treatment of osteoporosis, due for follow-up bone density scan.  Longstanding history of anxiety, she feels she is doing okay and is able to control it.  On occasion will take a single Benadryl to help with anxiety but that is rather rare.  She is noting occasional intermittent mild dysuria, thought initially it was related to bubble bath but continued occasional symptoms despite avoiding bubble bath for 2 months.  Denies any significant pain or dysuria, describes chronic frequency with mild recent increased frequency.  Denies any bladder leakage.  Admits that she is not exercising regularly.  States that she has some areas for improvement in diet.  Some struggles with hearing.  She is ,  is working part-time.  Spends 2 months in [place] each year.    Review of Systems:   Please see above.  The rest of the review of systems are negative for all systems.    Patient Care Team:  Celeste Jackson MD as PCP - General  Anirudh Curiel MD as Physician (Cardiology)  Celeste Jackson MD as Assigned PCP  Anirudh Curiel MD as Assigned Heart and Vascular Provider     Patient Active Problem List   Diagnosis     Hyperlipidemia     Impaired Fasting Glucose     Anxiety     Postmenopausal  Osteoporosis     Paroxysmal atrial fibrillation (H)     Past Medical History:   Diagnosis Date     Inverted nipple       Past Surgical History:   Procedure Laterality Date     NM APPENDECTOMY      Description: Appendectomy;  Recorded: 12/18/2007;     NM REMOVAL OF TONSILS,<11 Y/O      Description: Tonsillectomy;  Recorded: 12/18/2007;      Family History   Problem Relation Age of Onset     Hypertension Father       Social History     Socioeconomic History     Marital status:      Spouse name: Not on file     Number of children: Not on file     Years of education: Not on file     Highest education level: Not on file   Occupational History     Not on file   Social Needs     Financial resource strain: Not on file     Food insecurity     Worry: Not on file     Inability: Not on file     Transportation needs     Medical: Not on file     Non-medical: Not on file   Tobacco Use     Smoking status: Former Smoker     Smokeless tobacco: Never Used   Substance and Sexual Activity     Alcohol use: Not on file     Drug use: Not on file     Sexual activity: Not on file   Lifestyle     Physical activity     Days per week: Not on file     Minutes per session: Not on file     Stress: Not on file   Relationships     Social connections     Talks on phone: Not on file     Gets together: Not on file     Attends Restoration service: Not on file     Active member of club or organization: Not on file     Attends meetings of clubs or organizations: Not on file     Relationship status: Not on file     Intimate partner violence     Fear of current or ex partner: Not on file     Emotionally abused: Not on file     Physically abused: Not on file     Forced sexual activity: Not on file   Other Topics Concern     Not on file   Social History Narrative     Not on file      Current Outpatient Medications   Medication Sig Dispense Refill     cholecalciferol, vitamin D3, (VITAMIN D3) 2,000 unit capsule Take 1,000 Units by mouth daily.        "coenzyme Q10 (CO Q-10) 10 mg capsule Take 70 mg by mouth daily.       metoprolol succinate (TOPROL XL) 100 MG 24 hr tablet Take 1.5 tablets (150 mg total) by mouth daily. 135 tablet 3     multivitamin with minerals (THERA-M) 9 mg iron-400 mcg Tab tablet Take 1 tablet by mouth daily.       omega-3s/dha/epa/fish oil (OMEGA 3 ORAL) Take by mouth daily.       rivaroxaban ANTICOAGULANT (XARELTO) 20 mg tablet Take 1 tablet (20 mg total) by mouth daily with supper. 90 tablet 3     No current facility-administered medications for this visit.       Objective:   Vital Signs:   Visit Vitals  /82   Pulse 68   Temp 98  F (36.7  C) (Oral)   Resp 20   Ht 5' 7.5\" (1.715 m)   Wt 148 lb (67.1 kg)   LMP 08/03/1990   SpO2 98%   BMI 22.84 kg/m           VisionScreening:  No exam data present     PHYSICAL EXAM  Physical Examination: General appearance - alert, well appearing, and in no distress, oriented to person, place, and time and normal appearing weight  Mental status - alert, oriented to person, place, and time, normal mood, behavior, speech, dress, motor activity, and thought processes  Eyes - pupils equal and reactive, extraocular eye movements intact  Ears - bilateral TM's and external ear canals normal  Nose - normal and patent, no erythema, discharge or polyps  Mouth - mucous membranes moist, pharynx normal without lesions  Neck - supple, no significant adenopathy  Lymphatics - no palpable lymphadenopathy, no hepatosplenomegaly  Chest - clear to auscultation, no wheezes, rales or rhonchi, symmetric air entry  Heart - normal rate, regular rhythm, normal S1, S2, no murmurs, rubs, clicks or gallops  Abdomen - soft, nontender, nondistended, no masses or organomegaly  Breasts - breasts appear normal, no suspicious masses, no skin or nipple changes or axillary nodes  Neurological - alert, oriented, normal speech, no focal findings or movement disorder noted  Musculoskeletal - no joint tenderness, deformity or " swelling  Extremities - peripheral pulses normal, no pedal edema, no clubbing or cyanosis  Skin - normal coloration and turgor, no rashes, no suspicious skin lesions noted      Assessment Results 4/7/2021   Activities of Daily Living No help needed   Instrumental Activities of Daily Living No help needed   Mini Cog Total Score 5   Some recent data might be hidden     A Mini-Cog score of 0-2 suggests the possibility of dementia, score of 3-5 suggests no dementia      Lab Results   Component Value Date    COLORU Yellow 04/07/2021    CLARITYU Clear 04/07/2021    GLUCOSEU Negative 04/07/2021    BILIRUBINUR Negative 04/07/2021    KETONESU 15 mg/dL (!) 04/07/2021    SPECGRAV >=1.030 04/07/2021    HGBUA Trace (!) 04/07/2021    PHUR 5.5 04/07/2021    PROTEINUA 30 mg/dL (!) 04/07/2021    UROBILINOGEN 0.2 E.U./dL 04/07/2021    NITRITE Negative 04/07/2021    LEUKOCYTESUR Small (!) 04/07/2021    BACTERIA Few (!) 04/07/2021    RBCUA 0-2 04/07/2021    WBCUA 0-5 04/07/2021    SQUAMEPI 0-5 04/07/2021         Identified Health Risks:     She is at risk for lack of exercise and has been provided with information to increase physical activity for the benefit of her well-being.  The patient was counseled and encouraged to consider modifying their diet and eating habits. She was provided with information on recommended healthy diet options.  The patient was provided with written information regarding signs of hearing loss.  Patient's advanced directive was discussed and I am comfortable with the patient's wishes.

## 2021-06-17 NOTE — PROGRESS NOTES
Assessment and Plan:     1. Dysuria  Unfortunately, patient was unable to urinate while she was in the clinic.  Discussed treatment options.  Due to severity of symptoms, will treat with Bactrim DS.  Educated on its indications and side effects.  Discussed symptomatic treatment and methods of preventing urinary tract infections in the future.  She is to avoid taking bubble baths.  If no improvement in symptoms, she is to notify the clinic.  We may have her schedule a lab only urinalysis at that time or a follow-up appointment Dr. Jackson.  She is content with the plan.  - Urinalysis-UC if Indicated  - sulfamethoxazole-trimethoprim (BACTRIM DS) 800-160 mg per tablet; Take 1 tablet by mouth 2 (two) times a day for 3 days.  Dispense: 6 tablet; Refill: 0    Subjective:     Hector is a 77 y.o. female presenting to the clinic for concerns for urinary tract infection.  Patient states over the past 2 weeks, she has been experiencing urinary frequency, urinary urgency, and dysuria.  She has a history of similar symptoms after taking bubble baths.  Patient states the symptoms typically improve on  its own, but it has not this time.  She denies low back pain, fever, hematuria.  She has not tried any over-the-counter products for her symptoms.  She feels as though her symptoms are worsening.  Patient states when she travels to stores, she needs to know where the bathroom is at.  Unfortunately, patient urinated prior to being seen today.    Review of Systems: A complete 14 point review of systems was obtained and is negative or as stated in the history of present illness.    Social History     Social History     Marital status:      Spouse name: N/A     Number of children: N/A     Years of education: N/A     Occupational History     Not on file.     Social History Main Topics     Smoking status: Former Smoker     Smokeless tobacco: Never Used     Alcohol use Not on file     Drug use: Not on file     Sexual activity: Not  "on file     Other Topics Concern     Not on file     Social History Narrative       Active Ambulatory Problems     Diagnosis Date Noted     Hyperlipidemia      Impaired Fasting Glucose      Anxiety      Postmenopausal Osteoporosis      Paroxysmal atrial fibrillation 05/31/2017     Resolved Ambulatory Problems     Diagnosis Date Noted     Cerumen Impaction      Osteoporosis      Palpitations      Allergic Reaction      Past Medical History:   Diagnosis Date     Inverted nipple        Family History   Problem Relation Age of Onset     Hypertension Father        Objective:     /72  Pulse 82  Ht 5' 7.25\" (1.708 m)  Wt 144 lb 6.4 oz (65.5 kg)  BMI 22.45 kg/m2    Patient is alert, in no obvious distress.   Skin: Warm, dry.    Lungs:  Clear to auscultation. Respirations even and unlabored.  No wheezing or rales noted.   Heart:  Regular rate and rhythm.  No murmurs, S3, S4, gallops, or rubs.    Abdomen: Soft, nontender.  No organomegaly. Bowel sounds normoactive. No guarding or masses noted. CVA tenderness is negative bilaterally.               "

## 2021-06-17 NOTE — PATIENT INSTRUCTIONS - HE
Patient Instructions by Celeste Jackson MD at 5/16/2019  2:30 PM     Author: Celeste Jackson MD Service: -- Author Type: Physician    Filed: 5/16/2019  3:21 PM Encounter Date: 5/16/2019 Status: Signed    : Celeste Jackson MD (Physician)         Patient Education     Exercise for a Healthier Heart  You may wonder how you can improve the health of your heart. If youre thinking about exercise, youre on the right track. You dont need to become an athlete, but you do need a certain amount of brisk exercise to help strengthen your heart. If you have been diagnosed with a heart condition, your doctor may recommend exercise to help stabilize your condition. To help make exercise a habit, choose safe, fun activities.       Be sure to check with your health care provider before starting an exercise program.    Why exercise?  Exercising regularly offers many healthy rewards. It can help you do all of the following:    Improve your blood cholesterol levels to help prevent further heart trouble    Lower your blood pressure to help prevent a stroke or heart attack    Control diabetes, or reduce your risk of getting this disease    Improve your heart and lung function    Reach and maintain a healthy weight    Make your muscles stronger and more limber so you can stay active    Prevent falls and fractures by slowing the loss of bone mass (osteoporosis)    Manage stress better  Exercise tips  Ease into your routine. Set small goals. Then build on them.  Exercise on most days. Aim for a total of 150 or more minutes of moderate to  vigorous intensity activity each week. Consider 40 minutes, 3 to 4 times a week. For best results, activity should last for 40 minutes on average. It is OK to work up to the 40 minute period over time. Examples of moderate-intensity activity is walking one mile in 15 minutes or 30 to 45 minutes of yard work.  Step up your daily activity level. Along with your exercise program, try being  more active throughout the day. Walk instead of drive. Do more household tasks or yard work.  Choose one or more activities you enjoy. Walking is one of the easiest things you can do. You can also try swimming, riding a bike, or taking an exercise class.  Stop exercising and call your doctor if you:    Have chest pain or feel dizzy or lightheaded    Feel burning, tightness, pressure, or heaviness in your chest, neck, shoulders, back, or arms    Have unusual shortness of breath    Have increased joint or muscle pain    Have palpitations or an irregular heartbeat      4594-9108 [employer]. [address] [place] [address]. All rights reserved. This information is not intended as a substitute for professional medical care. Always follow your healthcare professional's instructions.         Patient Education   Understanding [employer] MyPlate  The USDA (US Department of Agriculture) has guidelines to help you make healthy food choices. These are called MyPlate. MyPlate shows the food groups that make up healthy meals using the image of a place setting. Before you eat, think about the healthiest choices for what to put onto your plate or into your cup or bowl. To learn more about building a healthy plate, visit www.choosemyplate.gov.       The Food Groups    Fruits: Any fruit or 100% fruit juice counts as part of the Fruit Group. Fruits may be fresh, canned, frozen, or dried, and may be whole, cut-up, or pureed. Make half your plate fruits and vegetables.    Vegetables: Any vegetable or 100% vegetable juice counts as a member of the Vegetable Group. Vegetables may be fresh, frozen, canned, or dried. They can be served raw or cooked and may be whole, cut-up, or mashed. Make half your plate fruits and vegetables.     Grains: All foods made from grains are part of the Grains Group. These include wheat, rice, oats, cornmeal, and barley such as bread, pasta, oatmeal, cereal, tortillas, and grits. Grains  should be no more than a quarter of your plate. At least half of your grains should be whole grains.    Protein: This group includes meat, poultry, seafood, beans and peas, eggs, processed soy products (like tofu), nuts (including nut butters), and seeds. Make protein choices no more than a quarter of your plate. Meat and poultry choices should be lean or low fat.    Dairy: All fluid milk products and foods made from milk that contain calcium, like yogurt and cheese are part of the Dairy Group. (Foods that have little calcium, such as cream, butter, and cream cheese, are not part of the group.) Most dairy choices should be low-fat or fat-free.    Oils: These are fats that are liquid at room temperature. They include canola, corn, olive, soybean, and sunflower oil. Foods that are mainly oil include mayonnaise, certain salad dressings, and soft margarines. You should have only 5 to 7 teaspoons of oils a day. You probably already get this much from the food you eat.  Use [employer] to Help Build Your Meals  The SuperTracker can help you plan and track your meals and activity. You can look up individual foods to see or compare their nutritional value. You can get guidelines for what and how much you should eat. You can compare your food choices. And you can assess personal physical activities and see ways you can improve. Go to www.[employer].gov/supertracker/.    3305-1288 [employer]. [address] [place] [address]. All rights reserved. This information is not intended as a substitute for professional medical care. Always follow your healthcare professional's instructions.             Advance Directive  Patients advance directive was discussed and I am comfortable with the patients wishes.  Patient Education   Personalized Prevention Plan  You are due for the preventive services outlined below.  Your care team is available to assist you in scheduling these services.  If you have already  completed any of these items, please share that information with your care team to update in your medical record.  Health Maintenance   Topic Date Due   ? ZOSTER VACCINES (2 of 3) 02/12/2008   ? FALL RISK ASSESSMENT  05/17/2019   ? INFLUENZA VACCINE RULE BASED (Season Ended) 08/01/2019   ? DXA SCAN  06/07/2020   ? ADVANCE DIRECTIVES DISCUSSED WITH PATIENT  05/17/2023   ? TD 18+ HE  05/22/2025   ? PNEUMOCOCCAL POLYSACCHARIDE VACCINE AGE 65 AND OVER  Completed   ? PNEUMOCOCCAL CONJUGATE VACCINE FOR ADULTS (PCV13 OR PREVNAR)  Completed

## 2021-06-18 NOTE — PATIENT INSTRUCTIONS - HE
Patient Instructions by Celeste Jackson MD at 4/7/2021  9:50 AM     Author: Celeste Jackson MD Service: -- Author Type: Physician    Filed: 4/7/2021 10:45 AM Encounter Date: 4/7/2021 Status: Signed    : Celeste Jackson MD (Physician)         Patient Education     Exercise for a Healthier Heart  You may wonder how you can improve the health of your heart. If youre thinking about exercise, youre on the right track. You dont need to become an athlete, but you do need a certain amount of brisk exercise to help strengthen your heart. If you have been diagnosed with a heart condition, your doctor may recommend exercise to help stabilize your condition. To help make exercise a habit, choose safe, fun activities.       Be sure to check with your health care provider before starting an exercise program.    Why exercise?  Exercising regularly offers many healthy rewards. It can help you do all of the following:    Improve your blood cholesterol levels to help prevent further heart trouble    Lower your blood pressure to help prevent a stroke or heart attack    Control diabetes, or reduce your risk of getting this disease    Improve your heart and lung function    Reach and maintain a healthy weight    Make your muscles stronger and more limber so you can stay active    Prevent falls and fractures by slowing the loss of bone mass (osteoporosis)    Manage stress better  Exercise tips  Ease into your routine. Set small goals. Then build on them.  Exercise on most days. Aim for a total of 150 or more minutes of moderate to  vigorous intensity activity each week. Consider 40 minutes, 3 to 4 times a week. For best results, activity should last for 40 minutes on average. It is OK to work up to the 40 minute period over time. Examples of moderate-intensity activity is walking one mile in 15 minutes or 30 to 45 minutes of yard work.  Step up your daily activity level. Along with your exercise program, try being more  active throughout the day. Walk instead of drive. Do more household tasks or yard work.  Choose one or more activities you enjoy. Walking is one of the easiest things you can do. You can also try swimming, riding a bike, or taking an exercise class.  Stop exercising and call your doctor if you:    Have chest pain or feel dizzy or lightheaded    Feel burning, tightness, pressure, or heaviness in your chest, neck, shoulders, back, or arms    Have unusual shortness of breath    Have increased joint or muscle pain    Have palpitations or an irregular heartbeat      [phone] [employer]. [address] [place] [address]. All rights reserved. This information is not intended as a substitute for professional medical care. Always follow your healthcare professional's instructions.         Patient Education   Understanding Thinker Thing MyPlate  The USDA (US Department of Agriculture) has guidelines to help you make healthy food choices. These are called MyPlate. MyPlate shows the food groups that make up healthy meals using the image of a place setting. Before you eat, think about the healthiest choices for what to put onto your plate or into your cup or bowl. To learn more about building a healthy plate, visit www.choosemyplate.gov.       The Food Groups    Fruits: Any fruit or 100% fruit juice counts as part of the Fruit Group. Fruits may be fresh, canned, frozen, or dried, and may be whole, cut-up, or pureed. Make half your plate fruits and vegetables.    Vegetables: Any vegetable or 100% vegetable juice counts as a member of the Vegetable Group. Vegetables may be fresh, frozen, canned, or dried. They can be served raw or cooked and may be whole, cut-up, or mashed. Make half your plate fruits and vegetables.     Grains: All foods made from grains are part of the Grains Group. These include wheat, rice, oats, cornmeal, and barley such as bread, pasta, oatmeal, cereal, tortillas, and grits. Grains should be  no more than a quarter of your plate. At least half of your grains should be whole grains.    Protein: This group includes meat, poultry, seafood, beans and peas, eggs, processed soy products (like tofu), nuts (including nut butters), and seeds. Make protein choices no more than a quarter of your plate. Meat and poultry choices should be lean or low fat.    Dairy: All fluid milk products and foods made from milk that contain calcium, like yogurt and cheese are part of the Dairy Group. (Foods that have little calcium, such as cream, butter, and cream cheese, are not part of the group.) Most dairy choices should be low-fat or fat-free.    Oils: These are fats that are liquid at room temperature. They include canola, corn, olive, soybean, and sunflower oil. Foods that are mainly oil include mayonnaise, certain salad dressings, and soft margarines. You should have only 5 to 7 teaspoons of oils a day. You probably already get this much from the food you eat.  Use [employer] to Help Build Your Meals  The [employer] can help you plan and track your meals and activity. You can look up individual foods to see or compare their nutritional value. You can get guidelines for what and how much you should eat. You can compare your food choices. And you can assess personal physical activities and see ways you can improve. Go to www.[employer].gov/supertracker/.    [phone] [employer]. [address] [place] [address]. All rights reserved. This information is not intended as a substitute for professional medical care. Always follow your healthcare professional's instructions.           Patient Education   Signs of Hearing Loss  Hearing loss is a problem shared by many people. In fact, it is one of the most common health conditions, particularly as people age. Most people over age 65 have some hearing loss, and by age 80, almost everyone does. Because hearing loss usually occurs slowly over the years,  you may not realize your hearing ability has gotten worse.       Have your hearing checked  Contact your [facility] care provider if you:    Have to strain to hear normal conversation.    Have to watch other peoples faces very carefully to follow what theyre saying.    Need to ask people to repeat what theyve said.    Often misunderstand what people are saying.    Turn the volume of the television or radio up so high that others complain.    Feel that people are mumbling when theyre talking to you.    Find that the effort to hear leaves you feeling tired and irritated.    Notice, when using the phone, that you hear better with 1 ear than the other.    [phone] [employer]. [address] [place] [address], [place] [address]. All rights reserved. This information is not intended as a substitute for professional medical care. Always follow your healthcare professional's instructions.           Advance Directive  Patients advance directive was discussed and I am comfortable with the patients wishes.  Patient Education   Personalized Prevention Plan  You are due for the preventive services outlined below.  Your care team is available to assist you in scheduling these services.  If you have already completed any of these items, please share that information with your care team to update in your medical record.  Health Maintenance Due   Topic Date Due   ? HEPATITIS C SCREENING  Never done   ? ZOSTER VACCINES (2 of 3) 02/12/2008

## 2021-06-18 NOTE — LETTER
Letter by Anirudh Curiel MD at      Author: Anirudh Curiel MD Service: -- Author Type: --    Filed:  Encounter Date: 1/16/2019 Status: (Other)       Hector Gill  [address] [place] [address]      January 16, 2019      Dear Hector,    This letter is to remind you that you will be due for your follow up appointment with Dr. Benjamin Curiel  . To help ensure you are in the best health possible, a regular follow-up with your cardiologist is essential.     Please call our Patient Scheduling Line at [phone] to schedule your appointment at your earliest convenience.  If you have recently scheduled an appointment, please disregard this letter.    We look forward to seeing you again. As always, we are available at the number  above for any questions or concerns you may have.      Sincerely,     The Physicians and Staff of [facility]

## 2021-06-18 NOTE — PROGRESS NOTES
Assessment and Plan:     1. Medicare annual wellness visit, subsequent  At today's visit, we discussed lifestyle interventions to promote self-management and wellness, including maintenance of a healthy weight, healthy diet, regular physical activity and exercise, and falls prevention.  Advised her to send her advanced healthcare directive to us for scanning into records.  Discussed Shingrix, she will consider.  Up-to-date with routine cancer screening, she is electing yearly mammograms at this time.    2. Osteoporosis  Continue alendronate.  Referral placed for bone density scan to be done in June.  - alendronate (FOSAMAX) 70 MG tablet; TAKE 1 TABLET EVERY 7 DAYS IN THE MORNING 30 MIN BEFORE FOOD WITH FULL GLASS OF WATER.  Dispense: 14 tablet; Refill: 3  - HM2(CBC w/o Differential)    3. Anxiety  Doing well without need for intervention.    4. Hyperlipidemia  Encouraged healthy lifestyle habits.  Will check fasting lipid cascade today.  - Lipid Dubois FASTING    5. Impaired fasting glucose  Current healthy lifestyle habits will check fasting glucose and A1c today.  - Glycosylated Hemoglobin A1c  - Glucose    6. Paroxysmal atrial fibrillation  Doing well on suppressive metoprolol and remains on Xarelto with elevated chads score is documented.    7. Postmenopausal Osteoporosis  She will be due for follow-up bone density scan in June, remains on alendronate.  Will check vitamin D level and hemogram in monitoring.  - Vitamin D, Total (25-Hydroxy)     The patient's current medical problems were reviewed.    I have had an Advance Directives discussion with the patient.  The following health maintenance schedule was reviewed with the patient and provided in printed form in the after visit summary:   Health Maintenance   Topic Date Due     FALL RISK ASSESSMENT  12/27/2007     DXA SCAN  06/27/2018     INFLUENZA VACCINE RULE BASED (Season Ended) 08/01/2018     ADVANCE DIRECTIVES DISCUSSED WITH PATIENT  05/31/2022     TD  18+ HE  05/22/2025     COLONOSCOPY  07/14/2025     PNEUMOCOCCAL POLYSACCHARIDE VACCINE AGE 65 AND OVER  Completed     PNEUMOCOCCAL CONJUGATE VACCINE FOR ADULTS (PCV13 OR PREVNAR)  Completed     ZOSTER VACCINE  Completed        Subjective:   Chief Complaint: Hector Gill is an 77 y.o. female here for an Annual Wellness visit.   HPI: She is doing quite well over the past year.  She has no concerns today.  History of paroxysmal atrial fibrillation, followed by Dr. Curiel of cardiology.  She is on Xarelto for anticoagulation and is on metoprolol for rate control, recent increase in October due to occasional breakthrough symptoms.  History of anxiety, this is been well controlled.  She is fasting today for follow-up of hyperlipidemia and impaired fasting glucose.  History of osteoporosis for which she takes alendronate weekly, tolerating well, due for follow-up bone density scan in June.    She is .  They winter in [place].  She is having her fourth great-grandchild in December.  She does not smoke.    Review of Systems:   Please see above.  The rest of the review of systems are negative for all systems.    Patient Care Team:  Celeste Jackson MD as PCP - General  Anirudh Curiel MD as Physician (Cardiology)     Patient Active Problem List   Diagnosis     Hyperlipidemia     Impaired Fasting Glucose     Anxiety     Postmenopausal Osteoporosis     Paroxysmal atrial fibrillation     Past Medical History:   Diagnosis Date     Inverted nipple       Past Surgical History:   Procedure Laterality Date     SC APPENDECTOMY      Description: Appendectomy;  Recorded: 12/18/2007;     SC REMOVAL OF TONSILS,<11 Y/O      Description: Tonsillectomy;  Recorded: 12/18/2007;      Family History   Problem Relation Age of Onset     Hypertension Father       Social History     Social History     Marital status:      Spouse name: N/A     Number of children: N/A     Years of education: N/A     Occupational History  "    Not on file.     Social History Main Topics     Smoking status: Former Smoker     Smokeless tobacco: Never Used     Alcohol use Not on file     Drug use: Not on file     Sexual activity: Not on file     Other Topics Concern     Not on file     Social History Narrative      Current Outpatient Prescriptions   Medication Sig Dispense Refill     alendronate (FOSAMAX) 70 MG tablet TAKE 1 TABLET EVERY 7 DAYS IN THE MORNING 30 MIN BEFORE FOOD WITH FULL GLASS OF WATER. 14 tablet 2     metoprolol succinate (TOPROL-XL) 100 MG 24 hr tablet Take 1 tablet (100 mg total) by mouth daily. 90 tablet 3     rivaroxaban (XARELTO) 20 mg Tab Take 1 tablet (20 mg total) by mouth daily. 90 tablet 3     No current facility-administered medications for this visit.       Objective:   Vital Signs:   Visit Vitals     /68     Pulse 100     Ht 5' 7.5\" (1.715 m)     Wt 146 lb (66.2 kg)     SpO2 98%     BMI 22.53 kg/m2        VisionScreening:  No exam data present     PHYSICAL EXAM  Physical Examination: General appearance - alert, well appearing, and in no distress, oriented to person, place, and time and normal appearing weight  Mental status - alert, oriented to person, place, and time, normal mood, behavior, speech, dress, motor activity, and thought processes  Eyes - pupils equal and reactive, extraocular eye movements intact  Ears - bilateral TM's and external ear canals normal  Nose - normal and patent, no erythema, discharge or polyps  Mouth - mucous membranes moist, pharynx normal without lesions  Neck - supple, no significant adenopathy  Lymphatics - no palpable lymphadenopathy, no hepatosplenomegaly  Chest - clear to auscultation, no wheezes, rales or rhonchi, symmetric air entry  Heart - normal rate, regular rhythm, normal S1, S2, no murmurs, rubs, clicks or gallops  Abdomen - soft, nontender, nondistended, no masses or organomegaly  Neurological - alert, oriented, normal speech, no focal findings or movement disorder " noted  Musculoskeletal - no joint tenderness, deformity or swelling  Extremities - peripheral pulses normal, no pedal edema, no clubbing or cyanosis  Skin - normal coloration and turgor, no rashes, no suspicious skin lesions noted     Assessment Results 5/17/2018   Activities of Daily Living No help needed   Instrumental Activities of Daily Living No help needed   Mini Cog Total Score 5   Some recent data might be hidden     A Mini-Cog score of 0-2 suggests the possibility of dementia, score of 3-5 suggests no dementia    Identified Health Risks:     She is at risk for lack of exercise and has been provided with information to increase physical activity for the benefit of her well-being.  The patient was counseled and encouraged to consider modifying their diet and eating habits. She was provided with information on recommended healthy diet options.  Patient's advanced directive was discussed and I am comfortable with the patient's wishes.

## 2021-06-20 NOTE — PROGRESS NOTES
[facility] Osteoporosis Consult  Note    Assessment/Plan:  1. Age-related osteoporosis without current pathological fracture  Diagnosis of osteoporosis greater than 10 years ago.  On alendronate for several years with one 2 year drug holiday (3069-1624).  It appears as though alendronate was reinstituted in 2014.  Analysis of all available bone densities dating back to 2008 was performed.  She has had no statistically significant decline and thus stability for many years.  Her history is negative for any fractures.  She is robust and compliant with both calcium and vitamin D administration.  Recommendations: Because of the duration of alendronate therapy approaching 10 years, and thus and associated with an increased risk of atypical femur fracture, will discontinue this medication.  Because she has not had any fractures, falls and has demonstrated stability throughout the years, will opt for a drug holiday.  Would like to see a follow-up bone density in 1-2 years.  If there is decline of a statistically significant nature in the next 1-2 years, will then proceed with initiation of Prolia therapy.  Additionally, recommend that she begin a balance and exercise class now that she is moving to [facility].  She is agreeable.      Follow-up with bone mineral density in 1-2 years.  If stable, would continue to monitor.  If there is any specific decline noted, would refer back for initiation of Prolia therapy.    Parul Miller MD    Chief Complaint:  Chief Complaint   Patient presents with     Osteoporosis Consult       History of Present Illness:  Hector Sierra) is a 77 y.o. female who is here today for discussion of bone health.  Of note, her chart is reviewed in depth.  She was diagnosed with osteoporosis before 2008 and has been treated with alendronate or bisphosphonate therapy for many years.  She states that she was on alendronate.  This was discontinued for approximately 2 years.  It was  restarted around 2014.  Of note, the chart review corroborates this information.  She states that she has not had any difficulty with fractures or falls.  She remains quite robust and considers herself healthy.    Osteoporosis review of system:    Social history: She is  with 4 adult children.  She is retired from clerical work.  She and her spouse are currently selling their home and will be moving to a one level condominium in [place].  She is excited to have new amenities and access to the [facility] in [place].  Lifestyle: There is a remote history of smoking.  She does not drink greater than 3 alcoholic beverages per day.  She engages in regular physical activity such as walking.  She denies any structured exercise classes per se.  Dietary history is reviewed.  She gets about 14 servings of dairy products per week along with 2-3 green vegetables per week as well.  She does enjoy diet Coke.  Fracture history is reviewed: There is no history of prior fractures  Contributing medical history is as follows: She has been on blood thinners for paroxysmal atrial fibrillation  Family history is reviewed: She denies osteoporosis, kidney stones or breast cancer in her mother.  Her dad had colon cancer.  High risk med use: None noted.  She is on Xarelto for paroxysmal atrial fibrillation  General health is assessed: She is up-to-date on ophthalmologic and vision care.  She wears glasses but does not have vision issues  There is no dental surgery planned.  She receives regular dental care  Her personal history is negative for kidney stones, gluten intolerance, unexplained anemia  Gynecologic history: Menstruation began at age 17 with menopause at age 48.  No prior history of hormone therapy    Bone density results:Assessment:     1. The spine bone density L1-L2 with T-score -2.7, stable compared to 2016.  2. Femoral bone densities show left femoral neck T- score -2.5 and right femoral neck T-score -2.0, stable.  3.  "Trabecular bone score indicates poor trabecular bone architecture.        77 y.o. female with OSTEOPOROSIS and stable bone density on the current treatment. Per history form, patient is taking oral bisphosphonate for 10 years.    Review of Systems:  A comprehensive review of systems was performed and was otherwise negative    PFSH:  Social History: As above she is .  She and her  will be moving to a new home  History   Smoking Status     Former Smoker   Smokeless Tobacco     Never Used       Past History:   Past Medical History:   Diagnosis Date     Inverted nipple        Current Outpatient Prescriptions   Medication Sig Dispense Refill     cholecalciferol, vitamin D3, (VITAMIN D3) 2,000 unit capsule Take 1,000 Units by mouth daily.       coenzyme Q10 (CO Q-10) 10 mg capsule Take 70 mg by mouth daily.       metoprolol succinate (TOPROL-XL) 100 MG 24 hr tablet Take 1 tablet (100 mg total) by mouth daily. 90 tablet 3     multivitamin with minerals (THERA-M) 9 mg iron-400 mcg Tab tablet Take 1 tablet by mouth daily.       omega-3s/dha/epa/fish oil (OMEGA 3 ORAL) Take by mouth daily.       rivaroxaban (XARELTO) 20 mg Tab Take 1 tablet (20 mg total) by mouth daily. 90 tablet 3     No current facility-administered medications for this visit.        Family History: No history of osteoporosis, renal stones or endocrinopathy    Physical Exam:  General Appearance:   She is robust in appearance.  She is healthy and in no acute distress  Vitals:    09/06/18 1215   BP: 110/62   Patient Site: Left Arm   Patient Position: Sitting   Cuff Size: Adult Regular   Pulse: 72   Weight: 143 lb (64.9 kg)   Height: 5' 7.25\" (1.708 m)     Wt Readings from Last 3 Encounters:   09/06/18 143 lb (64.9 kg)   05/17/18 146 lb (66.2 kg)   05/01/18 144 lb 6.4 oz (65.5 kg)     Body mass index is 22.23 kg/(m^2).        Data Review:    Analysis and Summary of Old Records (2): Reviewed old records    Records Requested (1): -      Other " History Summarized (from other people in the room) (2): -    Radiology Tests Summarized (XRAY/CT/MRI/DXA) (1): Reviewed all past bone densities that were available    Labs Reviewed (1): Laboratory studies reviewed    Medicine Tests Reviewed (EKG/ECHO/COLONOSCOPY/EGD) (1): -    Independent Review of EKG or X-RAY (2): -    -

## 2021-06-25 NOTE — PROGRESS NOTES
Progress Notes by Anirudh Curiel MD at 10/2/2017  1:10 PM     Author: Anirudh Curiel MD Service: -- Author Type: Physician    Filed: 10/2/2017  1:25 PM Encounter Date: 10/2/2017 Status: Signed    : Anirudh Curiel MD (Physician)                  [email or web address]           Thank you Dr. Jackson for asking the [facility] Heart Care team to participate in the care of your patient, Hector Gill.     Impression and Plan     1. Atrial fibrillation (paroxysmal). Patient with history of paroxysmal atrial fibrillation. As noted below, atrial fibrillation was initially diagnosed approximately 10 years ago when she had presented for a colonoscopy. Over the last decade, she has had intermittent palpitations consistent with paroxysmal atrial fibrillation. Patient was seen 24 May 2016 and found to have evidence of atrial fibrillation incidentally on routine examination though at my last follow-up visit with her she had manifested spontaneous restoration of sinus rhythm. [id number] score is 2 yielding an annulus risk of thromboembolism of 2.2-2.9%.     In follow-up today, Yue is clinically in atrial fibrillation on examination with an irregular rhythm. Heart rate in the 60s bpm. Plan:    Continue rivaroxaban (Xarelto ) 20 mg daily for CVA prophylaxis.     Continue metoprolol succinate, but increased to 100 mg daily.     Plan 1 follow up in one year.         History of Present Illness    Once again I would like to thank you again for asking me to participate in the care of your patient, Hector Gill.  As you know, but to reiterate for my own records, Hector Gill is a 76 y.o. female with history of paroxysmal atrial fibrillation. Specifically, patient states approximately 10 years ago she had presented for a colonoscopy and at that time was noted to have an irregular rhythm and found to have evidence of atrial fibrillation.      In May 2016, Yue was recently seen for  "routine follow-up and found to have evidence of atrial fibrillation. Patient states that she can intermittently notice an irregularity in her heart rhythm consistent with the rhythm disturbance. She states that it seems to be exacerbated by self-reported anxiety. At her last follow-up, however, she had demonstrated restoration of sinus rhythm.     Other than subjective intermittent palpitations patient is otherwise asymptomatic with the rhythm disturbance. She specifically denies any shortness of breath, chest pain, or subjective decline in her exercise tolerance. She denies any lightheadedness.    Further review of systems is otherwise negative/noncontributory (medical record and 13 point review of systems reviewed as well and pertinent positives noted).         Cardiac Diagnostics      Echocardiogram 17 June 2016 (personally reviewed):  1. Normal left ventricular size and systolic performance with ejection fraction of 55%.  2. Mild aortic insufficiency.    12-lead ECG (personally reviewed) 26 May 2016: Sinus rhythm with occasional PACs. Incomplete right bundle branch block. Mild nonspecific T-wave changes.     12-lead ECG (personally reviewed) 24 May 2016: Atrial fibrillation/flutter. Right bundle branch block.     12-lead ECG (personally reviewed) 27 October 2005: Normal sinus rhythm. PACs. Right bundle branch block.         Physical Examination       /70 (Patient Site: Right Arm, Patient Position: Sitting, Cuff Size: Adult Regular)  Pulse 60  Resp 18  Ht 5' 7.25\" (1.708 m)  Wt 150 lb (68 kg)  BMI 23.32 kg/m2        Wt Readings from Last 3 Encounters:   10/02/17 150 lb (68 kg)   05/31/17 150 lb 3.2 oz (68.1 kg)   10/06/16 152 lb (68.9 kg)     The patient is alert and oriented times three. Sclerae are anicteric. Mucosal membranes are moist. Jugular venous pressure is normal. No significant adenopathy/thyromegally appreciated. Lungs are clear with good expansion. On cardiovascular exam, the patient has " an irregular S1 and S2. Abdomen is soft and non-tender. Extremities reveal no clubbing, cyanosis, or edema.         Family History/Social History/Risk Factors   Patient does not smoke.  Family history of hypertension.         Medications  Allergies   Current Outpatient Prescriptions   Medication Sig Dispense Refill   ? alendronate (FOSAMAX) 70 MG tablet TAKE 1 TABLET EVERY 7 DAYS IN THE MORNING 30 MIN BEFORE FOOD WITH FULL GLASS OF WATER. 14 tablet 2   ? alendronate (FOSAMAX) 70 MG tablet Take 1 tablet (70 mg total) by mouth every 7 days. Take in the morning on an empty stomach with a full glass of water 30 minutes before food 12 tablet 4   ? metoprolol succinate (TOPROL-XL) 100 MG 24 hr tablet Take 1 tablet (100 mg total) by mouth daily. 90 tablet 3   ? rivaroxaban (XARELTO) 20 mg Tab Take 1 tablet (20 mg total) by mouth daily. 90 tablet 3     No current facility-administered medications for this visit.       No Known Allergies       Lab Results   Lab Results   Component Value Date     05/31/2017    K 4.6 05/31/2017     05/31/2017    CO2 24 05/31/2017    BUN 19 05/31/2017    CREATININE 0.98 05/31/2017    CALCIUM 9.5 05/31/2017     Lab Results   Component Value Date    WBC 5.8 05/31/2017    HGB 14.7 05/31/2017    HCT 43.1 05/31/2017    MCV 88 05/31/2017     05/31/2017     Lab Results   Component Value Date    CHOL 218 (H) 05/31/2017    TRIG 78 05/31/2017    HDL 64 05/31/2017    LDLCALC 138 (H) 05/31/2017     Lab Results   Component Value Date    TSH 1.20 05/24/2016

## 2021-06-26 ENCOUNTER — HEALTH MAINTENANCE LETTER (OUTPATIENT)
Age: 81
End: 2021-06-26

## 2021-06-26 NOTE — PROGRESS NOTES
Progress Notes by Anirudh Curiel MD at 9/10/2018  1:30 PM     Author: Anirudh Curiel MD Service: -- Author Type: Physician    Filed: 9/10/2018  1:58 PM Encounter Date: 9/10/2018 Status: Signed    : Anirudh Curiel MD (Physician)                  [email or web address]  [phone]         Thank you Dr. Jackson for asking the [facility] Heart Care team to participate in the care of your patient, Hector Gill.     Impression and Plan     1. Atrial fibrillation (paroxysmal). Patient with history of paroxysmal atrial fibrillation. As noted below, atrial fibrillation was initially diagnosed approximately 10 years ago when she had presented for a colonoscopy. Over the last decade, she has had intermittent palpitations consistent with paroxysmal atrial fibrillation. Patient was seen 24 May 2016 and found to have evidence of atrial fibrillation incidentally on routine examination though at my last follow-up visit with her she had manifested spontaneous restoration of sinus rhythm. [id number] score is 2 yielding an annulus risk of thromboembolism of 2.2-2.9%.     In follow-up today, Yue is clinically is in atrial fibrillation on examination with an irregular rhythm. Heart rate in the 110s bpm. Plan:    Continue rivaroxaban (Xarelto ) 20 mg daily for CVA prophylaxis.     Continue metoprolol succinate, but increase from 100 mg daily to 150 mg daily to further optimize ventricular response.    Holter monitor in approximately 2 weeks to assess efficacy of therapeutic change.     2.  Hypertension.  Patient's blood pressure slightly elevated in the office today.    Continue metoprolol succinate, but increase as per problem #1.  ?  Continue metoprolol succinate, but increase as per problem #1.    Further recommendations and follow-up pending Holter monitor findings         History of Present Illness    Once again I would like to thank you again for asking me to participate in the care of  your patient, Hector Gill.  As you know, but to reiterate for my own records, Hector Gill is a 77 y.o. female with history of paroxysmal atrial fibrillation. Specifically, patient states approximately 10 years ago she had presented for a colonoscopy and at that time was noted to have an irregular rhythm and found to have evidence of atrial fibrillation.      In May 2016, Yue was recently seen for routine follow-up and found to have evidence of atrial fibrillation. Patient states that she can intermittently notice an irregularity in her heart rhythm consistent with the rhythm disturbance. She states that it seems to be exacerbated by self-reported anxiety. At her last follow-up, however, she had demonstrated restoration of sinus rhythm.     Other than subjective intermittent palpitations patient is otherwise asymptomatic with the rhythm disturbance. She specifically denies any shortness of breath, chest pain, or subjective decline in her exercise tolerance. She denies any lightheadedness.    Further review of systems is otherwise negative/noncontributory (medical record and 13 point review of systems reviewed as well and pertinent positives noted).         Cardiac Diagnostics      Echocardiogram 17 June 2016 (personally reviewed):  1. Normal left ventricular size and systolic performance with ejection fraction of 55%.  2. Mild aortic insufficiency.     12-lead ECG (personally reviewed) 10 September 2018: Atrial fibrillation with heart rate of 112 bpm.  Right bundle branch block.    12-lead ECG (personally reviewed) 26 May 2016: Sinus rhythm with occasional PACs. Incomplete right bundle branch block. Mild nonspecific T-wave changes.     12-lead ECG (personally reviewed) 24 May 2016: Atrial fibrillation/flutter. Right bundle branch block.     12-lead ECG (personally reviewed) 27 October 2005: Normal sinus rhythm. PACs. Right bundle branch block.           Physical Examination       /70 (Patient  "Site: Right Arm, Patient Position: Sitting, Cuff Size: Adult Regular)  Pulse 100  Resp 14  Ht 5' 7.25\" (1.708 m)  Wt 143 lb (64.9 kg)  LMP 08/03/1990  BMI 22.23 kg/m2        Wt Readings from Last 3 Encounters:   09/10/18 143 lb (64.9 kg)   09/06/18 143 lb (64.9 kg)   05/17/18 146 lb (66.2 kg)       The patient is alert and oriented times three. Sclerae are anicteric. Mucosal membranes are moist. Jugular venous pressure is normal. No significant adenopathy/thyromegally appreciated. Lungs are clear with good expansion. On cardiovascular exam, the patient has an irregular S1 and S2. Abdomen is soft and non-tender. Extremities reveal no clubbing, cyanosis, or edema.       Family History/Social History/Risk Factors   Patient does not smoke.  Family history of hypertension.         Medications  Allergies   Current Outpatient Prescriptions   Medication Sig Dispense Refill   ? cholecalciferol, vitamin D3, (VITAMIN D3) 2,000 unit capsule Take 1,000 Units by mouth daily.     ? coenzyme Q10 (CO Q-10) 10 mg capsule Take 70 mg by mouth daily.     ? multivitamin with minerals (THERA-M) 9 mg iron-400 mcg Tab tablet Take 1 tablet by mouth daily.     ? omega-3s/dha/epa/fish oil (OMEGA 3 ORAL) Take by mouth daily.     ? rivaroxaban (XARELTO) 20 mg Tab Take 1 tablet (20 mg total) by mouth daily with supper. 90 tablet 3   ? metoprolol succinate (TOPROL XL) 100 MG 24 hr tablet Take 1.5 tablets (150 mg total) by mouth daily. 135 tablet 3     No current facility-administered medications for this visit.       No Known Allergies       Lab Results   Lab Results   Component Value Date     05/31/2017    K 4.6 05/31/2017     05/31/2017    CO2 24 05/31/2017    BUN 19 05/31/2017    CREATININE 0.98 05/31/2017    CALCIUM 9.5 05/31/2017     Lab Results   Component Value Date    WBC 6.1 05/17/2018    HGB 14.3 05/17/2018    HCT 42.3 05/17/2018    MCV 89 05/17/2018     05/17/2018     Lab Results   Component Value Date    CHOL " 220 (H) 05/17/2018    TRIG 68 05/17/2018    HDL 73 05/17/2018    LDLCALC 133 (H) 05/17/2018     Lab Results   Component Value Date    TSH 1.20 05/24/2016

## 2021-06-27 NOTE — PROGRESS NOTES
Progress Notes by Anirudh Curiel MD at 6/11/2019  1:10 PM     Author: Anirudh Curiel MD Service: -- Author Type: Physician    Filed: 6/11/2019  1:17 PM Encounter Date: 6/11/2019 Status: Signed    : Anirudh Curiel MD (Physician)                  [email or web address]  [phone]         Thank you Dr. Jackson for asking the [facility] Heart Care team to participate in the care of your patient, Hector Gill.     Impression and Plan     1. Atrial fibrillation (paroxysmal). Patient with history of paroxysmal atrial fibrillation. As noted below, atrial fibrillation was initially diagnosed approximately 10-15 years ago when she had presented for a colonoscopy.     Over the last decade, she has had intermittent palpitations consistent with paroxysmal atrial fibrillation. Patient was seen 24 May 2016 and found to have evidence of atrial fibrillation incidentally on routine examination though at my last follow-up visit with her she had manifested spontaneous restoration of sinus rhythm.  Today, her rhythm is once again irregular and suspect that she is indeed in atrial fibrillation currently.  She is completely unaware of the rhythm disturbance and as has been historically continues to be asymptomatic.  [id number] score is 4 yielding an annual embolic risk of 4.8-6.7%.    Yue did have a Holter monitor 24 September 2018 which revealed well-controlled ventricular response.Plan:    Continue rivaroxaban (Xarelto ) 20 mg daily for CVA prophylaxis.      2.  Hypertension.  Patient's blood pressure is fairly reasonable in the office today.     Plan on follow-up in 1 year.           History of Present Illness    Once again I would like to thank you again for asking me to participate in the care of your patient, Hector Gill.  As you know, but to reiterate for my own records, Hector Gill is a 78 y.o. female with history of paroxysmal atrial fibrillation. Specifically, patient  "states approximately 10-15 years ago she had presented for a colonoscopy and at that time was noted to have an irregular rhythm and found to have evidence of atrial fibrillation.      In May 2016, Yue was seen for routine follow-up and found to have evidence of atrial fibrillation.  She is essentially asymptomatic with the rhythm disturbance.     She specifically denies any shortness of breath, chest pain, or subjective decline in her exercise tolerance. She denies any lightheadedness.    Further review of systems is otherwise negative/noncontributory (medical record and 13 point review of systems reviewed as well and pertinent positives noted).         Cardiac Diagnostics      Echocardiogram 17 June 2016 (personally reviewed):  1. Normal left ventricular size and systolic performance with ejection fraction of 55%.  2. Mild aortic insufficiency.    Holter monitor 24 September 2018:   1. Abnormal Holter monitor tracing by virtue the persistent atrial fibrillation demonstrated throughout the recording.  2. Regular response appears to be well controlled in atrial fibrillation.  3. The patient reported no symptoms.  4. No significant ventricular arrhythmia  5. No profound bradycardia or pauses.    12-lead ECG (personally reviewed) 10 September 2018: Atrial fibrillation with heart rate of 112 bpm.  Right bundle branch block.     12-lead ECG (personally reviewed) 26 May 2016: Sinus rhythm with occasional PACs. Incomplete right bundle branch block. Mild nonspecific T-wave changes.     12-lead ECG (personally reviewed) 24 May 2016: Atrial fibrillation/flutter. Right bundle branch block.     12-lead ECG (personally reviewed) 27 October 2005: Normal sinus rhythm. PACs. Right bundle branch block.            Physical Examination       /84 (Patient Site: Right Arm, Patient Position: Sitting, Cuff Size: Adult Regular)   Pulse 84   Resp 16   Ht 5' 7\" (1.702 m)   Wt 141 lb (64 kg)   LMP 08/03/1990   BMI 22.08 kg/m     "      Wt Readings from Last 3 Encounters:   06/11/19 141 lb (64 kg)   05/16/19 139 lb (63 kg)   09/10/18 143 lb (64.9 kg)     The patient is alert and oriented times three. Sclerae are anicteric. Mucosal membranes are moist. Jugular venous pressure is normal. No significant adenopathy/thyromegally appreciated. Lungs are clear with good expansion. On cardiovascular exam, the patient has an irregular S1 and S2. Abdomen is soft and non-tender. Extremities reveal no clubbing, cyanosis, or edema.       Family History/Social History/Risk Factors   Patient does not smoke.  Family history of hypertension.         Medications  Allergies   Current Outpatient Medications   Medication Sig Dispense Refill   ? cholecalciferol, vitamin D3, (VITAMIN D3) 2,000 unit capsule Take 1,000 Units by mouth daily.     ? coenzyme Q10 (CO Q-10) 10 mg capsule Take 70 mg by mouth daily.     ? multivitamin with minerals (THERA-M) 9 mg iron-400 mcg Tab tablet Take 1 tablet by mouth daily.     ? omega-3s/dha/epa/fish oil (OMEGA 3 ORAL) Take by mouth daily.     ? metoprolol succinate (TOPROL XL) 100 MG 24 hr tablet Take 1.5 tablets (150 mg total) by mouth daily. 135 tablet 3   ? rivaroxaban (XARELTO) 20 mg Tab Take 1 tablet (20 mg total) by mouth daily with supper. 90 tablet 3     No current facility-administered medications for this visit.       No Known Allergies       Lab Results   Lab Results   Component Value Date     05/31/2017    K 4.6 05/31/2017     05/31/2017    CO2 24 05/31/2017    BUN 19 05/31/2017    CREATININE 0.98 05/31/2017    CALCIUM 9.5 05/31/2017     Lab Results   Component Value Date    WBC 6.1 05/17/2018    HGB 14.3 05/17/2018    HCT 42.3 05/17/2018    MCV 89 05/17/2018     05/17/2018     Lab Results   Component Value Date    CHOL 209 (H) 05/16/2019    TRIG 79 05/16/2019    HDL 65 05/16/2019    LDLCALC 128 05/16/2019     Lab Results   Component Value Date    TSH 1.20 05/24/2016

## 2021-06-28 ENCOUNTER — HOSPITAL ENCOUNTER (OUTPATIENT)
Dept: MAMMOGRAPHY | Facility: CLINIC | Age: 81
Discharge: HOME | End: 2021-06-28
Attending: FAMILY MEDICINE
Payer: COMMERCIAL

## 2021-06-28 ENCOUNTER — RECORDS - HEALTHEAST (OUTPATIENT)
Dept: ADMINISTRATIVE | Facility: OTHER | Age: 81
End: 2021-06-28

## 2021-06-28 ENCOUNTER — RECORDS - HEALTHEAST (OUTPATIENT)
Dept: BONE DENSITY | Facility: CLINIC | Age: 81
End: 2021-06-28

## 2021-06-28 DIAGNOSIS — I48.0 PAROXYSMAL ATRIAL FIBRILLATION (H): ICD-10-CM

## 2021-06-28 DIAGNOSIS — Z12.31 VISIT FOR SCREENING MAMMOGRAM: ICD-10-CM

## 2021-06-28 DIAGNOSIS — M81.0 AGE-RELATED OSTEOPOROSIS WITHOUT CURRENT PATHOLOGICAL FRACTURE: ICD-10-CM

## 2021-06-28 RX ORDER — METOPROLOL SUCCINATE 100 MG/1
200 TABLET, EXTENDED RELEASE ORAL DAILY
Qty: 180 TABLET | Refills: 1 | Status: SHIPPED | OUTPATIENT
Start: 2021-06-28 | End: 2022-05-05

## 2021-06-29 NOTE — PROGRESS NOTES
"Progress Notes by Anirudh Curiel MD at 6/23/2020 11:10 AM     Author: Anirudh Curiel MD Service: -- Author Type: Physician    Filed: 6/23/2020 12:03 PM Encounter Date: 6/23/2020 Status: Signed    : Anirudh Curiel MD (Physician)          The patient has been notified of following:     \"This video visit will be conducted via a call between you and your physician/provider. We have found that certain health care needs can be provided without the need for an in-person physical exam.  This service lets us provide the care you need with a video conversation.  If a prescription is necessary we can send it directly to your pharmacy.  If lab work is needed we can place an order for that and you can then stop by our lab to have the test done at a later time.      Patient has given verbal consent to a Video visit? Yes        HEART CARE PHONE ENCOUNTER     The patient has chosen to have the visit conducted as a video visit, to reduce risk of exposure given the current status of Coronavirus in our community. This video visit is being conducted via a call between the patient and physician/provider. Health care needs are being provided without a physical exam.      Impression and Plan     1. Atrial fibrillation (paroxysmal). Patient with history of paroxysmal atrial fibrillation. As noted below, atrial fibrillation was initially diagnosed approximately 10-15 years ago when she had presented for a colonoscopy.      Over the last decade, she has had intermittent palpitations consistent with paroxysmal atrial fibrillation.  Typically, however, her atrial fibrillation has been incidentally picked up on examinations.  She is for the most part completely asymptomatic with the rhythm disturbance.  Patient was seen 24 May 2016.  [id number] score is 4 yielding an annual embolic risk of 4.8-6.7%.     Yue did have a Holter monitor 24 September 2018 which revealed well-controlled ventricular " response.Plan:    Continue rivaroxaban (Xarelto ) 20 mg daily for CVA prophylaxis.      2.  Hypertension.  Patient's blood pressure has been fairly reasonable on her home blood pressure monitoring device.     Plan on follow-up in 1 year.      Total time of video between patient and provider was 12 minutes       Start time: 1045    Stop time: 1057    Originating Location (pt. Location): Home    Distant Location (provider location):  [facility]     Mode of Communication:  Video Conference via [email or web address]         History of Present Illness    Hector Gill is a 79 y.o. female who is being evaluated via a billable telephone visit.     Once again I would like to thank you again for asking me to participate in the care of your patient, Hector Gill.  As you know, but to reiterate for my own records, Hector Gill is a 79 y.o. female with history of paroxysmal atrial fibrillation. Specifically, patient states approximately 10-15 years ago she had presented for a colonoscopy and at that time was noted to have an irregular rhythm and found to have evidence of atrial fibrillation.      In May 2016, Yue was seen for routine follow-up and found to have evidence of atrial fibrillation.  She is essentially asymptomatic with the rhythm disturbance.     She specifically denies any shortness of breath, chest pain, or subjective decline in her exercise tolerance. She denies any lightheadedness.    Further review of systems is otherwise negative/noncontributory (medical record and 13 point review of systems reviewed as well and pertinent positives noted).         Cardiac Diagnostics     Echocardiogram 17 June 2016 (personally reviewed):  1. Normal left ventricular size and systolic performance with ejection fraction of 55%.  2. Mild aortic insufficiency.    Holter monitor 24 September 2018:   1. Abnormal Holter monitor tracing by virtue the persistent atrial fibrillation demonstrated throughout the  recording.  2. Regular response appears to be well controlled in atrial fibrillation.  3. The patient reported no symptoms.  4. No significant ventricular arrhythmia  5. No profound bradycardia or pauses.    12-lead ECG (personally reviewed) 10 September 2018: Atrial fibrillation with heart rate of 112 bpm.  Right bundle branch block.     12-lead ECG (personally reviewed) 26 May 2016: Sinus rhythm with occasional PACs. Incomplete right bundle branch block. Mild nonspecific T-wave changes.     12-lead ECG (personally reviewed) 24 May 2016: Atrial fibrillation/flutter. Right bundle branch block.     12-lead ECG (personally reviewed) 27 October 2005: Normal sinus rhythm. PACs. Right bundle branch block.          Physical Examination performed via live video encounter Review of Systems   General Appearance:   no distress, normal body habitus, upright.   ENT/Mouth: membranes moist, no nasal discharge or bleeding gums.  Normal head shape, no evidence of injury or laceration.     EYES:  no scleral icterus, normal conjunctivae   Neck: no evidence of thyromegaly.  Supple   Chest/Lungs:   No audible wheezing equal chest wall expansion. Non labored breathing.  No cough.   Cardiovascular:   No evidence of elevated jugular venous pressure.  No evidence of pitting edema bilaterally    Abdomen:  no evidence of abdominal distention. No observe juandice.     Extremities: no cyanosis or clubbing noted.    Skin: no xanthelasma, normal skin collar. No evidence of facial lacerations.      Neurologic: Normal arm motion bilateral, no tremors.  No evidence of focal defect.       Psychiatric: alert and oriented x3, calm                                               Family History/Social History/Risk Factors   Patient does not smoke.  Family history reviewed, and family history includes Hypertension in her father.        Medications  Allergies   Current Outpatient Medications   Medication Sig Dispense Refill   ? cholecalciferol, vitamin D3,  (VITAMIN D3) 2,000 unit capsule Take 1,000 Units by mouth daily.     ? coenzyme Q10 (CO Q-10) 10 mg capsule Take 70 mg by mouth daily.     ? metoprolol succinate (TOPROL XL) 100 MG 24 hr tablet Take 1.5 tablets (150 mg total) by mouth daily. 135 tablet 3   ? multivitamin with minerals (THERA-M) 9 mg iron-400 mcg Tab tablet Take 1 tablet by mouth daily.     ? omega-3s/dha/epa/fish oil (OMEGA 3 ORAL) Take by mouth daily.     ? rivaroxaban ANTICOAGULANT (XARELTO) 20 mg tablet Take 1 tablet (20 mg total) by mouth daily with supper. 90 tablet 3     No current facility-administered medications for this visit.       No Known Allergies       Lab Results   Lab Results   Component Value Date     05/31/2017    K 4.6 05/31/2017     05/31/2017    CO2 24 05/31/2017    BUN 19 05/31/2017    CREATININE 0.98 05/31/2017    CALCIUM 9.5 05/31/2017     Lab Results   Component Value Date    WBC 6.1 05/17/2018    HGB 14.3 05/17/2018    HCT 42.3 05/17/2018    MCV 89 05/17/2018     05/17/2018     Lab Results   Component Value Date    CHOL 209 (H) 05/16/2019    TRIG 79 05/16/2019    HDL 65 05/16/2019    LDLCALC 128 05/16/2019     Lab Results   Component Value Date    TSH 1.20 05/24/2016           Medical History  Surgical History   Past Medical History:   Diagnosis Date   ? Inverted nipple       Past Surgical History:   Procedure Laterality Date   ? MA APPENDECTOMY      Description: Appendectomy;  Recorded: 12/18/2007;   ? MA REMOVAL OF TONSILS,<13 Y/O      Description: Tonsillectomy;  Recorded: 12/18/2007;

## 2021-06-30 ENCOUNTER — AMBULATORY - HEALTHEAST (OUTPATIENT)
Dept: FAMILY MEDICINE | Facility: CLINIC | Age: 81
End: 2021-06-30

## 2021-06-30 ENCOUNTER — COMMUNICATION - HEALTHEAST (OUTPATIENT)
Dept: FAMILY MEDICINE | Facility: CLINIC | Age: 81
End: 2021-06-30

## 2021-06-30 DIAGNOSIS — M81.0 SENILE OSTEOPOROSIS: ICD-10-CM

## 2021-06-30 NOTE — PROGRESS NOTES
Progress Notes by Anirudh Curiel MD at 5/24/2021 10:30 AM     Author: Anirudh Curiel MD Service: -- Author Type: Physician    Filed: 5/24/2021 11:46 AM Encounter Date: 5/24/2021 Status: Signed    : Anirudh Curiel MD (Physician)                                       Thank you Dr. Jackson for asking the [facility] team to participate in the care of your patient, Hector Gill.     Impression and Plan     1. Atrial fibrillation (essentially permanent at this point). Patient with history of permanent atrial fibrillation. As noted below, atrial fibrillation was initially diagnosed approximately 10-15 years ago when she had presented for a colonoscopy.      Over the last decade, she has had intermittent palpitations consistent with paroxysmal atrial fibrillation.  Typically, however, her atrial fibrillation has been incidentally picked up on examinations.  She is for the most part completely asymptomatic with the rhythm disturbance.  Patient was seen 24 May 2016.  [id number] score is 4 yielding an annual embolic risk of 4.8-6.7%.     Yue did have a Holter monitor 24 September 2018 that revealed well-controlled ventricular response.trickle response somewhat increased on today's visit, however.  Plan:    Continue rivaroxaban (Xarelto ) 20 mg daily for CVA prophylaxis.     Continue metoprolol succinate, but increase from 150 mg daily to 200 mg daily.    Holter monitor in approximately 2 weeks post medication change to assess efficacy of increased dose.     2.    Aortic insufficiency.  This was felt mild on echocardiogram in 2016.  Plan:    Repeat echocardiogram to not only assess aortic insufficiency, but also suspect some degree of mitral insufficiency on exam.    3.  Hypertension.    Blood pressure somewhat elevated in the office today.  Plan:    Increase metoprolol as per problem #1.      We will review aforementioned test, but tentatively plan on follow-up in 1 year  unless tests dictate need for follow-up sooner.    30 minutes spent reviewing prior records (including documentation, laboratory studies, cardiac testing/imaging), interview with patient along with physical exam, planning, and subsequent documentation/crafting of note.           History of Present Illness    Once again I would like to thank you again for asking me to participate in the care of your patient, Hector Gill.  As you know, but to reiterate for my own records, Hector Gill is a 80 y.o. female with history of paroxysmal atrial fibrillation. Specifically, patient states approximately 10-15 years ago she had presented for a colonoscopy and at that time was noted to have an irregular rhythm and found to have evidence of atrial fibrillation.      In May 2016, Yue was seen for routine follow-up and found to have evidence of atrial fibrillation.  She is essentially asymptomatic with the rhythm disturbance.     She specifically denies any shortness of breath, chest pain, or subjective decline in her exercise tolerance. She denies any lightheadedness.  She reports no fevers, chills, or other constitutional symptoms.    Further review of systems is otherwise negative/noncontributory (medical record and 13 point review of systems reviewed as well and pertinent positives noted).         Cardiac Diagnostics      Echocardiogram 17 June 2016 (personally reviewed):  1. Normal left ventricular size and systolic performance with ejection fraction of 55%.  2. Mild aortic insufficiency.    Holter monitor 24 September 2018:   1. Abnormal Holter monitor tracing by virtue the persistent atrial fibrillation demonstrated throughout the recording.  2. Regular response appears to be well controlled in atrial fibrillation.  3. The patient reported no symptoms.  4. No significant ventricular arrhythmia  5. No profound bradycardia or pauses.    12-lead ECG (personally reviewed) 5/24/2021: Atrial fibrillation with heart  "rate of 110s bpm.  Right bundle branch block.    12-lead ECG (personally reviewed) 10 September 2018: Atrial fibrillation with heart rate of 112 bpm.  Right bundle branch block.     12-lead ECG (personally reviewed) 26 May 2016: Sinus rhythm with occasional PACs. Incomplete right bundle branch block. Mild nonspecific T-wave changes.     12-lead ECG (personally reviewed) 24 May 2016: Atrial fibrillation/flutter. Right bundle branch block.     12-lead ECG (personally reviewed) 27 October 2005: Normal sinus rhythm. PACs. Right bundle branch block.            Physical Examination       /84 (Patient Site: Left Arm, Patient Position: Sitting, Cuff Size: Adult Regular)   Pulse 84   Resp 16   Ht 5' 7.5\" (1.715 m)   Wt 147 lb (66.7 kg)   LMP 08/03/1990   BMI 22.68 kg/m          Wt Readings from Last 3 Encounters:   05/24/21 147 lb (66.7 kg)   04/07/21 148 lb (67.1 kg)   06/23/20 153 lb (69.4 kg)       The patient is alert and oriented times three. Sclerae are anicteric. Mucosal membranes are moist. Jugular venous pressure is normal. No significant adenopathy/thyromegally appreciated. Lungs are clear with good expansion. On cardiovascular exam, the patient has an irregular S1 and S2. Abdomen is soft and non-tender. Extremities reveal no clubbing, cyanosis, or edema.       Family History/Social History/Risk Factors   Patient does not smoke.  Family history reviewed, and family history includes Hypertension in her father.          Medications  Allergies   Current Outpatient Medications   Medication Sig Dispense Refill   ? cholecalciferol, vitamin D3, (VITAMIN D3) 2,000 unit capsule Take 1,000 Units by mouth daily.     ? coenzyme Q10 (CO Q-10) 10 mg capsule Take 70 mg by mouth daily.     ? multivitamin with minerals (THERA-M) 9 mg iron-400 mcg Tab tablet Take 1 tablet by mouth daily.     ? omega-3s/dha/epa/fish oil (OMEGA 3 ORAL) Take by mouth daily.     ? metoprolol succinate (TOPROL-XL) 200 MG 24 hr tablet Take 1 " tablet (200 mg total) by mouth daily. 90 tablet 3   ? rivaroxaban ANTICOAGULANT (XARELTO) 20 mg tablet Take 1 tablet (20 mg total) by mouth daily with supper. 90 tablet 3     No current facility-administered medications for this visit.       No Known Allergies       Lab Results   Lab Results   Component Value Date     04/07/2021    K 4.3 04/07/2021     04/07/2021    CO2 26 04/07/2021    BUN 16 04/07/2021    CREATININE 0.83 04/07/2021    CALCIUM 9.0 04/07/2021     Lab Results   Component Value Date    WBC 5.9 04/07/2021    HGB 14.7 04/07/2021    HCT 43.7 04/07/2021    MCV 90 04/07/2021     04/07/2021     Lab Results   Component Value Date    CHOL 191 04/07/2021    TRIG 76 04/07/2021    HDL 64 04/07/2021    LDLCALC 112 04/07/2021     Lab Results   Component Value Date    TSH 1.20 05/24/2016

## 2021-07-06 VITALS
BODY MASS INDEX: 22.28 KG/M2 | DIASTOLIC BLOOD PRESSURE: 84 MMHG | HEIGHT: 68 IN | RESPIRATION RATE: 16 BRPM | SYSTOLIC BLOOD PRESSURE: 140 MMHG | WEIGHT: 147 LBS | HEART RATE: 84 BPM

## 2021-07-06 VITALS — WEIGHT: 147 LBS | BODY MASS INDEX: 23.07 KG/M2 | HEIGHT: 67 IN

## 2021-07-21 ENCOUNTER — RECORDS - HEALTHEAST (OUTPATIENT)
Dept: ADMINISTRATIVE | Facility: CLINIC | Age: 81
End: 2021-07-21

## 2021-07-22 NOTE — LETTER
Letter by Celeste Jackson MD at      Author: Celeste Jackson MD Service: -- Author Type: --    Filed:  Encounter Date: 6/30/2021 Status: (Other)         Hector Gill  [id number] [place] [address]             June 30, 2021        Dear Ms. Juancarloskamille,    Below are the results from your recent visit:    Your recent bone density scan shows that your osteoporosis is stable.  Based on your visit with Dr. Miller of osteoporosis care (in 2018), We should continue to monitor your bone density by repeating it in 2 years and considering a new treatment if we see a significant decline.    Please call with questions or contact us using [employer].    Sincerely,        Electronically signed by Celeste Jackson MD

## 2021-10-16 ENCOUNTER — HEALTH MAINTENANCE LETTER (OUTPATIENT)
Age: 81
End: 2021-10-16

## 2021-11-11 ENCOUNTER — IMMUNIZATION (OUTPATIENT)
Dept: NURSING | Facility: CLINIC | Age: 81
End: 2021-11-11
Payer: MEDICARE

## 2021-11-11 PROCEDURE — 91300 PR COVID VAC PFIZER DIL RECON 30 MCG/0.3 ML IM: CPT

## 2021-11-11 PROCEDURE — 0004A PR COVID VAC PFIZER DIL RECON 30 MCG/0.3 ML IM: CPT

## 2022-05-05 ENCOUNTER — OFFICE VISIT (OUTPATIENT)
Dept: FAMILY MEDICINE | Facility: CLINIC | Age: 82
End: 2022-05-05
Payer: COMMERCIAL

## 2022-05-05 VITALS
HEIGHT: 67 IN | SYSTOLIC BLOOD PRESSURE: 134 MMHG | OXYGEN SATURATION: 97 % | BODY MASS INDEX: 22.81 KG/M2 | TEMPERATURE: 97.9 F | RESPIRATION RATE: 18 BRPM | DIASTOLIC BLOOD PRESSURE: 82 MMHG | HEART RATE: 82 BPM | WEIGHT: 145.3 LBS

## 2022-05-05 DIAGNOSIS — F41.1 ANXIETY STATE: ICD-10-CM

## 2022-05-05 DIAGNOSIS — M81.0 SENILE OSTEOPOROSIS: ICD-10-CM

## 2022-05-05 DIAGNOSIS — Z00.00 MEDICARE ANNUAL WELLNESS VISIT, SUBSEQUENT: Primary | ICD-10-CM

## 2022-05-05 DIAGNOSIS — E78.5 HYPERLIPIDEMIA, UNSPECIFIED HYPERLIPIDEMIA TYPE: ICD-10-CM

## 2022-05-05 DIAGNOSIS — R73.01 IMPAIRED FASTING GLUCOSE: ICD-10-CM

## 2022-05-05 DIAGNOSIS — I48.0 PAROXYSMAL ATRIAL FIBRILLATION (H): ICD-10-CM

## 2022-05-05 LAB
ANION GAP SERPL CALCULATED.3IONS-SCNC: 13 MMOL/L (ref 5–18)
BUN SERPL-MCNC: 18 MG/DL (ref 8–28)
CALCIUM SERPL-MCNC: 9.3 MG/DL (ref 8.5–10.5)
CHLORIDE BLD-SCNC: 103 MMOL/L (ref 98–107)
CHOLEST SERPL-MCNC: 206 MG/DL
CO2 SERPL-SCNC: 24 MMOL/L (ref 22–31)
CREAT SERPL-MCNC: 0.79 MG/DL (ref 0.6–1.1)
FASTING STATUS PATIENT QL REPORTED: ABNORMAL
GFR SERPL CREATININE-BSD FRML MDRD: 76 ML/MIN/1.73M2
GLUCOSE BLD-MCNC: 107 MG/DL (ref 70–125)
HBA1C MFR BLD: 5.1 % (ref 0–5.6)
HDLC SERPL-MCNC: 71 MG/DL
LDLC SERPL CALC-MCNC: 122 MG/DL
POTASSIUM BLD-SCNC: 4.2 MMOL/L (ref 3.5–5)
SODIUM SERPL-SCNC: 140 MMOL/L (ref 136–145)
TRIGL SERPL-MCNC: 67 MG/DL

## 2022-05-05 PROCEDURE — 36415 COLL VENOUS BLD VENIPUNCTURE: CPT | Performed by: FAMILY MEDICINE

## 2022-05-05 PROCEDURE — 80048 BASIC METABOLIC PNL TOTAL CA: CPT | Performed by: FAMILY MEDICINE

## 2022-05-05 PROCEDURE — 83036 HEMOGLOBIN GLYCOSYLATED A1C: CPT | Performed by: FAMILY MEDICINE

## 2022-05-05 PROCEDURE — 80061 LIPID PANEL: CPT | Performed by: FAMILY MEDICINE

## 2022-05-05 PROCEDURE — 0054A COVID-19,PF,PFIZER (12+ YRS): CPT | Performed by: FAMILY MEDICINE

## 2022-05-05 PROCEDURE — 82306 VITAMIN D 25 HYDROXY: CPT | Performed by: FAMILY MEDICINE

## 2022-05-05 PROCEDURE — 99397 PER PM REEVAL EST PAT 65+ YR: CPT | Mod: 25 | Performed by: FAMILY MEDICINE

## 2022-05-05 PROCEDURE — 91305 COVID-19,PF,PFIZER (12+ YRS): CPT | Performed by: FAMILY MEDICINE

## 2022-05-05 PROCEDURE — 99214 OFFICE O/P EST MOD 30 MIN: CPT | Mod: 25 | Performed by: FAMILY MEDICINE

## 2022-05-05 RX ORDER — ALENDRONATE SODIUM 70 MG/1
70 TABLET ORAL
Qty: 14 TABLET | Refills: 4 | Status: SHIPPED | OUTPATIENT
Start: 2022-05-05 | End: 2023-05-08

## 2022-05-05 ASSESSMENT — ACTIVITIES OF DAILY LIVING (ADL): CURRENT_FUNCTION: NO ASSISTANCE NEEDED

## 2022-05-05 NOTE — PATIENT INSTRUCTIONS
Patient Education   Personalized Prevention Plan  You are due for the preventive services outlined below.  Your care team is available to assist you in scheduling these services.  If you have already completed any of these items, please share that information with your care team to update in your medical record.  Health Maintenance Due   Topic Date Due     ANNUAL REVIEW OF HM ORDERS  Never done     LUNG CANCER SCREENING  Never done     Zoster (Shingles) Vaccine (2 of 3) 02/12/2008     Diptheria Tetanus Pertussis (DTAP/TDAP/TD) Vaccine (1 - Tdap) 05/22/2015     FALL RISK ASSESSMENT  04/07/2022       Understanding USDA MyPlate  The USDA has guidelines to help you make healthy food choices. These are called MyPlate. MyPlate shows the food groups that make up healthy meals using the image of a place setting. Before you eat, think about the healthiest choices for what to put on your plate or in your cup or bowl. To learn more about building a healthy plate, visit www.choosemyplate.gov.    The food groups    Fruits. Any fruit or 100% fruit juice counts as part of the Fruit Group. Fruits may be fresh, canned, frozen, or dried, and may be whole, cut-up, or pureed. Make 1/2 of your plate fruits and vegetables.    Vegetables. Any vegetable or 100% vegetable juice counts as a member of the Vegetable Group. Vegetables may be fresh, frozen, canned, or dried. They can be served raw or cooked and may be whole, cut-up, or mashed. Make 1/2 of your plate fruits and vegetables.    Grains. All foods made from grains are part of the Grains Group. These include wheat, rice, oats, cornmeal, and barley. Grains are often used to make foods such as bread, pasta, oatmeal, cereal, tortillas, and grits. Grains should be no more than 1/4 of your plate. At least half of your grains should be whole grains.    Protein. This group includes meat, poultry, seafood, beans and peas, eggs, processed soy products (such as tofu), nuts (including nut  butters), and seeds. Make protein choices no more than 1/4 of your plate. Meat and poultry choices should be lean or low fat.    Dairy. The Dairy Group includes all fluid milk products and foods made from milk that contain calcium, such as yogurt and cheese. (Foods that have little calcium, such as cream, butter, and cream cheese, are not part of this group.) Most dairy choices should be low-fat or fat-free.    Oils. Oils aren't a food group, but they do contain essential nutrients. However it's important to watch your intake of oils. These are fats that are liquid at room temperature. They include canola, corn, olive, soybean, vegetable, and sunflower oil. Foods that are mainly oil include mayonnaise, certain salad dressings, and soft margarines. You likely already get your daily oil allowance from the foods you eat.  Things to limit  Eating healthy also means limiting these things in your diet:       Salt (sodium). Many processed foods have a lot of sodium. To keep sodium intake down, eat fresh vegetables, meats, poultry, and seafood when possible. Purchase low-sodium, reduced-sodium, or no-salt-added food products at the store. And don't add salt to your meals at home. Instead, season them with herbs and spices such as dill, oregano, cumin, and paprika. Or try adding flavor with lemon or lime zest and juice.    Saturated fat. Saturated fats are most often found in animal products such as beef, pork, and chicken. They are often solid at room temperature, such as butter. To reduce your saturated fat intake, choose leaner cuts of meat and poultry. And try healthier cooking methods such as grilling, broiling, roasting, or baking. For a simple lower-fat swap, use plain nonfat yogurt instead of mayonnaise when making potato salad or macaroni salad.    Added sugars. These are sugars added to foods. They are in foods such as ice cream, candy, soda, fruit drinks, sports drinks, energy drinks, cookies, pastries, jams, and  syrups. Cut down on added sugars by sharing sweet treats with a family member or friend. You can also choose fruit for dessert, and drink water or other unsweetened beverages.     [employer] last reviewed this educational content on 6/1/2020 2000-2021 [employer]. All rights reserved. This information is not intended as a substitute for professional medical care. Always follow your healthcare professional's instructions.          Signs of Hearing Loss      Hearing much better with one ear can be a sign of hearing loss.   Hearing loss is a problem shared by many people. In fact, it is one of the most common health problems, particularly as people age. Most people age 65 and older have some hearing loss. By age 80, almost everyone does. Hearing loss often occurs slowly over the years. So you may not realize your hearing has gotten worse.  Have your hearing checked  Call your healthcare provider if you:    Have to strain to hear normal conversation    Have to watch other people s faces very carefully to follow what they re saying    Need to ask people to repeat what they ve said    Often misunderstand what people are saying    Turn the volume of the television or radio up so high that others complain    Feel that people are mumbling when they re talking to you    Find that the effort to hear leaves you feeling tired and irritated    Notice, when using the phone, that you hear better with one ear than the other  [employer] last reviewed this educational content on 1/1/2020 2000-2021 [employer]. All rights reserved. This information is not intended as a substitute for professional medical care. Always follow your healthcare professional's instructions.

## 2022-05-05 NOTE — PROGRESS NOTES
"SUBJECTIVE:   Hector Gill is a 81 year old female who presents for Preventive Visit.      Patient has been advised of split billing requirements and indicates understanding: Yes  Are you in the first 12 months of your Medicare coverage?  no    Doing well over the past year without significant changes in health.  Fasting for follow-up of dyslipidemia and impaired fasting glucose.  Taking metoprolol for rate control in the setting of paroxysmal atrial fibrillation, taking Xarelto for stroke prevention.  She has follow-up with cardiology later this month.  No longer experiencing any sensation of palpitations, denies lightheadedness, dizziness, headaches, chest pain, dyspnea, or swelling.  History of osteoporosis, took Fosamax for several years, took Fosamax holiday with worsening of bone density, did not follow-up with osteoporosis care this year.  She is wondering about resuming Fosamax which is reasonable.  She is on chronic hearing loss its not disruptive in life.  Overall feels that she is eating healthy diet.  Exercise is irregular with some intermittent walking, considering Silver sneakers.  History of smoking, quit in 1981.    Healthy Habits:     In general, how would you rate your overall health?  Excellent    Duration of exercise:  15-30 minutes    Do you usually eat at least 4 servings of fruit and vegetables a day, include whole grains    & fiber and avoid regularly eating high fat or \"junk\" foods?  No    Taking medications regularly:  Yes    Medication side effects:  Not applicable    Ability to successfully perform activities of daily living:  No assistance needed    Home Safety:  No safety concerns identified    Hearing Impairment:  Difficulty following a conversation in a noisy restaurant or crowded room and need to ask people to speak up or repeat themselves    In the past 6 months, have you been bothered by leaking of urine?  No    In general, how would you rate your overall mental or emotional " health?  Excellent      PHQ-2 Total Score: 0    Additional concerns today:  No    Do you feel safe in your environment? Yes    Have you ever done Advance Care Planning? (For example, a Health Directive, POLST, or a discussion with a medical provider or your loved ones about your wishes): Yes, patient states has an Advance Care Planning document and will bring a copy to the clinic.       Fall risk  No falls in the last year and no fear of falling  Cognitive Screening   1) Repeat 3 items (Leader, Season, Table)    2) Clock draw: NORMAL  3) 3 item recall: Recalls 3 objects  Results: 3 items recalled: COGNITIVE IMPAIRMENT LESS LIKELY    Mini-CogTM Copyright S Gael. Licensed by the author for use in [facility]; reprinted with permission (sozia@[facility]). All rights reserved.        Reviewed and updated as needed this visit by clinical staff   Tobacco  Allergies  Meds                Reviewed and updated as needed this visit by Provider                   Social History     Tobacco Use     Smoking status: Former Smoker     Smokeless tobacco: Never Used   Substance Use Topics     Alcohol use: Not on file         Alcohol Use 5/5/2022   Prescreen: >3 drinks/day or >7 drinks/week? No           Current providers sharing in care for this patient include:   Patient Care Team:  Celeste Jackson MD as PCP - General  Celeste Jackson MD as Assigned PCP  Anirudh Curiel MD as Assigned Heart and Vascular Provider    The following health maintenance items are reviewed in Epic and correct as of today:  Health Maintenance Due   Topic Date Due     ANNUAL REVIEW OF  ORDERS  Never done     LUNG CANCER SCREENING  Never done     ZOSTER IMMUNIZATION (2 of 3) 02/12/2008     DTAP/TDAP/TD IMMUNIZATION (1 - Tdap) 05/22/2015     FALL RISK ASSESSMENT  04/07/2022     MEDICARE ANNUAL WELLNESS VISIT  04/07/2022     Patient Active Problem List   Diagnosis     Hyperlipidemia     Impaired Fasting Glucose     Anxiety      "Postmenopausal Osteoporosis     Paroxysmal atrial fibrillation (H)     Past Surgical History:   Procedure Laterality Date     HC REMOVAL OF TONSILS,<13 Y/O      Description: Tonsillectomy;  Recorded: 12/18/2007;     ZZC APPENDECTOMY      Description: Appendectomy;  Recorded: 12/18/2007;       Social History     Tobacco Use     Smoking status: Former Smoker     Smokeless tobacco: Never Used   Substance Use Topics     Alcohol use: Not on file     Family History   Problem Relation Age of Onset     Hypertension Father      Breast Cancer No family hx of          Current Outpatient Medications   Medication Sig Dispense Refill     alendronate (FOSAMAX) 70 MG tablet Take 1 tablet (70 mg) by mouth every 7 days 14 tablet 4     cholecalciferol, vitamin D3, (VITAMIN D3) 2,000 unit capsule [CHOLECALCIFEROL, VITAMIN D3, (VITAMIN D3) 2,000 UNIT CAPSULE] Take 1,000 Units by mouth daily.       coenzyme Q10 (CO Q-10) 10 mg capsule [COENZYME Q10 (CO Q-10) 10 MG CAPSULE] Take 70 mg by mouth daily.       metoprolol succinate (TOPROL-XL) 200 MG 24 hr tablet [METOPROLOL SUCCINATE (TOPROL-XL) 200 MG 24 HR TABLET] Take 1 tablet (200 mg total) by mouth daily. 90 tablet 3     multivitamin with minerals (THERA-M) 9 mg iron-400 mcg Tab tablet [MULTIVITAMIN WITH MINERALS (THERA-M) 9 MG IRON-400 MCG TAB TABLET] Take 1 tablet by mouth daily.       omega-3s/dha/epa/fish oil (OMEGA 3 ORAL) [OMEGA-3S/DHA/EPA/FISH OIL (OMEGA 3 ORAL)] Take by mouth daily.       rivaroxaban ANTICOAGULANT (XARELTO) 20 mg tablet [RIVAROXABAN ANTICOAGULANT (XARELTO) 20 MG TABLET] Take 1 tablet (20 mg total) by mouth daily with supper. 90 tablet 3     No Known Allergies    Pertinent mammograms are reviewed under the imaging tab.    Review of Systems  Constitutional, HEENT, cardiovascular, pulmonary, gi and gu systems are negative, except as otherwise noted.    OBJECTIVE:   /82   Pulse 82   Temp 97.9  F (36.6  C) (Oral)   Resp 18   Ht 1.702 m (5' 7\")   Wt 65.9 kg " "(145 lb 4.8 oz)   SpO2 97%   BMI 22.76 kg/m   Estimated body mass index is 22.76 kg/m  as calculated from the following:    Height as of this encounter: 1.702 m (5' 7\").    Weight as of this encounter: 65.9 kg (145 lb 4.8 oz).  Physical Exam  GENERAL: healthy, alert and no distress  EYES: Eyes grossly normal to inspection, PERRL and conjunctivae and sclerae normal  HENT: ear canals and TM's normal, nose and mouth without ulcers or lesions  NECK: no adenopathy, no asymmetry, masses, or scars and thyroid normal to palpation  RESP: lungs clear to auscultation - no rales, rhonchi or wheezes  BREAST: normal without masses, tenderness or nipple discharge and no palpable axillary masses or adenopathy  CV: Irregularly irregular rhythm, normal rate normal S1 S2, no S3 or S4, 2 out of 6 systolic murmur right upper sternal border, no click or rub, no peripheral edema and peripheral pulses strong  ABDOMEN: soft, nontender, no hepatosplenomegaly, no masses and bowel sounds normal  MS: no gross musculoskeletal defects noted, no edema  SKIN: no suspicious lesions or rashes  NEURO: Normal strength and tone, mentation intact and speech normal  PSYCH: mentation appears normal, affect normal/bright    Diagnostic Test Results:  Labs reviewed in Epic    ASSESSMENT / PLAN:     Medicare annual wellness visit, subsequent  At today's visit, we discussed lifestyle interventions to promote self-management and wellness, including maintenance of a healthy weight, healthy diet, regular physical activity and exercise, and falls prevention.  COVID second booster administered today.  Encourage consideration of Shingrix.  Reviewed mammogram yearly which she intends to continue which is reasonable.    Impaired fasting glucose  Encourage efforts at healthy lifestyle habits.  Will check fasting glucose and A1c today to reassess.  Of note, she had Diet Coke prior to today's visit.  - Basic metabolic panel; Future  - Hemoglobin A1c; " "Future    Hyperlipidemia, unspecified hyperlipidemia type  Discussed importance of healthy lifestyle habits.  Will check fasting lipids today.  Of note, she had Diet Coke prior to visit today.  - Lipid panel reflex to direct LDL Fasting; Future    Paroxysmal atrial fibrillation (H)  Rate controlled on Toprol-XL, anticoagulated with Xarelto.  Encourage follow-up with Dr. Curiel.  It is noted that her ejection fraction was 44% 1 year ago, thought to be related to rate control.  We will look to cardiology to determine if this is significant or requiring any further follow-up.  - Basic metabolic panel; Future    Postmenopausal Osteoporosis  We will check vitamin D level to ensure sufficiency.  Discussed measures to reduce risk of falls.  Discussed weightbearing activities.  With reinitiate Fosamax.  Recheck bone density next year.  - Vitamin D Deficiency; Future  - alendronate (FOSAMAX) 70 MG tablet; Take 1 tablet (70 mg) by mouth every 7 days    Anxiety  Doing well without need for medication.    Patient has been advised of split billing requirements and indicates understanding: Yes    COUNSELING:  Reviewed preventive health counseling, as reflected in patient instructions    Estimated body mass index is 22.76 kg/m  as calculated from the following:    Height as of this encounter: 1.702 m (5' 7\").    Weight as of this encounter: 65.9 kg (145 lb 4.8 oz).        She reports that she has quit smoking. She has never used smokeless tobacco.      Appropriate preventive services were discussed with this patient, including applicable screening as appropriate for cardiovascular disease, diabetes, osteopenia/osteoporosis, and glaucoma.  As appropriate for age/gender, discussed screening for colorectal cancer, prostate cancer, breast cancer, and cervical cancer. Checklist reviewing preventive services available has been given to the patient.    Reviewed patients plan of care and provided an AVS. The Basic Care Plan (routine " screening as documented in Health Maintenance) for Hector meets the Care Plan requirement. This Care Plan has been established and reviewed with the Patient.    Counseling Resources:  ATP IV Guidelines  Pooled Cohorts Equation Calculator  Breast Cancer Risk Calculator  Breast Cancer: Medication to Reduce Risk  FRAX Risk Assessment  ICSI Preventive Guidelines  Dietary Guidelines for Americans, 2010  [employer]'s MyPlate  ASA Prophylaxis  Lung CA Screening    Celeste Jackson MD  [facility]    Identified Health Risks:    The patient was counseled and encouraged to consider modifying their diet and eating habits. She was provided with information on recommended healthy diet options.  The patient was provided with written information regarding signs of hearing loss.

## 2022-05-06 LAB — DEPRECATED CALCIDIOL+CALCIFEROL SERPL-MC: 51 UG/L (ref 20–75)

## 2022-05-31 ENCOUNTER — OFFICE VISIT (OUTPATIENT)
Dept: CARDIOLOGY | Facility: CLINIC | Age: 82
End: 2022-05-31
Payer: COMMERCIAL

## 2022-05-31 VITALS
WEIGHT: 142.6 LBS | HEART RATE: 90 BPM | RESPIRATION RATE: 12 BRPM | BODY MASS INDEX: 22.33 KG/M2 | DIASTOLIC BLOOD PRESSURE: 82 MMHG | SYSTOLIC BLOOD PRESSURE: 130 MMHG

## 2022-05-31 DIAGNOSIS — I48.21 PERMANENT ATRIAL FIBRILLATION (H): Primary | ICD-10-CM

## 2022-05-31 DIAGNOSIS — I48.0 PAROXYSMAL ATRIAL FIBRILLATION (H): ICD-10-CM

## 2022-05-31 DIAGNOSIS — I10 HYPERTENSION, UNSPECIFIED TYPE: ICD-10-CM

## 2022-05-31 DIAGNOSIS — I35.1 AORTIC VALVE INSUFFICIENCY, ETIOLOGY OF CARDIAC VALVE DISEASE UNSPECIFIED: ICD-10-CM

## 2022-05-31 DIAGNOSIS — I42.9 CARDIOMYOPATHY, UNSPECIFIED TYPE (H): ICD-10-CM

## 2022-05-31 PROCEDURE — 99214 OFFICE O/P EST MOD 30 MIN: CPT | Performed by: INTERNAL MEDICINE

## 2022-05-31 RX ORDER — METOPROLOL SUCCINATE 200 MG/1
200 TABLET, EXTENDED RELEASE ORAL DAILY
Qty: 90 TABLET | Refills: 3 | Status: SHIPPED | OUTPATIENT
Start: 2022-05-31 | End: 2023-05-09

## 2022-05-31 NOTE — PROGRESS NOTES
[facility]   [address], [place] [address]   [email or web address]   OFFICE: [phone]          Thank you Celeste Chaparro for asking the [facility] team to participate in the care of your patient, Hector Gill.     Impression and Plan     1. Atrial fibrillation (permanent). Patient with history of permanent atrial fibrillation. As noted below, atrial fibrillation was initially diagnosed approximately 10-15 years ago when she had presented for a colonoscopy.      Over the last decade, she has had intermittent palpitations consistent with paroxysmal atrial fibrillation.  Typically, however, her atrial fibrillation has been incidentally picked up on examinations.  She is for the most part completely asymptomatic with the rhythm disturbance.   [id number] score is 4-5.     Holter monitor 7 June 2021 revealed well-controlled ventricular response. Plan:    Continue rivaroxaban (Xarelto ) 20 mg daily for CVA prophylaxis.     Continue metoprolol succinate 200 mg daily.     2.    Cardiomyopathy.  Echocardiogram 25 May 2021 suggested mild-moderate reduction left ventricular systolic performance though depression and LV function may have been some months.  Since that she was in atrial fibrillation clinic.  We did discuss obtaining a repeat assessment of LV function though she wanted to hold off on any testing at this time.  May consider at follow-up next year.    3.  Aortic insufficiency.  This is felt mild on most recent echocardiogram 25 May 2021    4.  Hypertension.  Blood pressure is fairly reasonable in the office today.     Plan on follow-up in 1 year.    35 minutes spent reviewing prior records (including documentation, laboratory studies, cardiac testing/imaging), interview with patient along with physical exam, planning, and subsequent documentation/crafting of note).           History of Present Illness    Once again I would like to  thank you again for asking me to participate in the care of your patient, Hector Gill.  As you know, but to reiterate for my own records, Hector Gill is a 81 year old female with permanent atrial fibrillation. She specifically denies any shortness of breath, chest pain, or subjective decline in her exercise tolerance. She denies any lightheadedness.  She reports no fevers, chills, or other constitutional symptoms.    Further review of systems is otherwise negative/noncontributory (medical record and 13 point review of systems reviewed as well and pertinent positives noted).         Cardiac Diagnostics      Echocardiogram 25 May 2021:  1. Normal left ventricular size with mild-moderate reduction left ventricular systolic performance.  Ejection fraction 40-45%.  2. Mild aortic insufficiency.  3. Normal right ventricular size and systolic performance.  4. Moderate biatrial enlargement.     Echocardiogram 17 June 2016 (personally reviewed):  1. Normal left ventricular size and systolic performance with ejection fraction of 55%.  2. Mild aortic insufficiency.    Holter monitor 7 June 2021:  1. Persistent atrial fibrillation with controlled ventricular response.    2. Right bundle branch block noted.    Holter monitor 24 September 2018:   1. Abnormal Holter monitor tracing by virtue the persistent atrial fibrillation demonstrated throughout the recording.  2. Regular response appears to be well controlled in atrial fibrillation.  3. The patient reported no symptoms.  4. No significant ventricular arrhythmia  5. No profound bradycardia or pauses.     12-lead ECG (personally reviewed) 5/24/2021: Atrial fibrillation with heart rate of 110s bpm.  Right bundle branch block.     12-lead ECG (personally reviewed) 10 September 2018: Atrial fibrillation with heart rate of 112 bpm.  Right bundle branch block.     12-lead ECG (personally reviewed) 26 May 2016: Sinus rhythm with occasional PACs. Incomplete right bundle  branch block. Mild nonspecific T-wave changes.     12-lead ECG (personally reviewed) 24 May 2016: Atrial fibrillation/flutter. Right bundle branch block.     12-lead ECG (personally reviewed) 27 October 2005: Normal sinus rhythm. PACs. Right bundle branch block.         Physical Examination       /82 (BP Location: Right arm, Patient Position: Sitting, Cuff Size: Adult Regular)   Pulse 90   Resp 12   Wt 64.7 kg (142 lb 9.6 oz)   BMI 22.33 kg/m          Wt Readings from Last 3 Encounters:   05/31/22 64.7 kg (142 lb 9.6 oz)   05/05/22 65.9 kg (145 lb 4.8 oz)   05/24/21 66.7 kg (147 lb)       The patient is alert and oriented times three. Sclerae are anicteric. Mucosal membranes are moist. Jugular venous pressure is normal. No significant adenopathy/thyromegally appreciated. Lungs are clear with good expansion. On cardiovascular exam, the patient has an irregular S1 and S2. Abdomen is soft and non-tender. Extremities reveal no clubbing, cyanosis, or edema.       Medications  Allergies   Current Outpatient Medications   Medication Sig Dispense Refill     alendronate (FOSAMAX) 70 MG tablet Take 1 tablet (70 mg) by mouth every 7 days 14 tablet 4     cholecalciferol, vitamin D3, (VITAMIN D3) 2,000 unit capsule [CHOLECALCIFEROL, VITAMIN D3, (VITAMIN D3) 2,000 UNIT CAPSULE] Take 1,000 Units by mouth daily.       coenzyme Q10 (CO Q-10) 10 mg capsule [COENZYME Q10 (CO Q-10) 10 MG CAPSULE] Take 70 mg by mouth daily.       metoprolol succinate ER (TOPROL XL) 200 MG 24 hr tablet Take 1 tablet (200 mg) by mouth daily 90 tablet 3     multivitamin with minerals (THERA-M) 9 mg iron-400 mcg Tab tablet [MULTIVITAMIN WITH MINERALS (THERA-M) 9 MG IRON-400 MCG TAB TABLET] Take 1 tablet by mouth daily.       omega-3s/dha/epa/fish oil (OMEGA 3 ORAL) [OMEGA-3S/DHA/EPA/FISH OIL (OMEGA 3 ORAL)] Take by mouth daily.       rivaroxaban ANTICOAGULANT (XARELTO ANTICOAGULANT) 20 MG TABS tablet Take 1 tablet (20 mg) by mouth daily (with  dinner) 90 tablet 3     No Known Allergies       Lab Results    Chemistry/lipid CBC Cardiac Enzymes/BNP/TSH/INR   Recent Labs   Lab Test 05/05/22  1135   CHOL 206*   HDL 71      TRIG 67     Recent Labs   Lab Test 05/05/22  1135 04/07/21  1050 05/16/19  1527    112 128     Recent Labs   Lab Test 05/05/22  1135      POTASSIUM 4.2   CHLORIDE 103   CO2 24      BUN 18   CR 0.79   GFRESTIMATED 76   DOMENIC 9.3     Recent Labs   Lab Test 05/05/22  1135 04/07/21  1050   CR 0.79 0.83     Recent Labs   Lab Test 05/05/22  1135 04/07/21  1050 05/16/19  1527   A1C 5.1 4.9 5.6          Recent Labs   Lab Test 04/07/21  1050   WBC 5.9   HGB 14.7   HCT 43.7   MCV 90        Recent Labs   Lab Test 04/07/21  1050 05/17/18  0938   HGB 14.7 14.3    No results for input(s): TROPONINI in the last 59171 hours.  No results for input(s): BNP, NTBNPI, NTBNP in the last 96460 hours.  No results for input(s): TSH in the last 12575 hours.  No results for input(s): INR in the last 72506 hours.     Medical History  Surgical History Family History Social History   Past Medical History:   Diagnosis Date     Inverted nipple      Past Surgical History:   Procedure Laterality Date     HC REMOVAL OF TONSILS,<15 Y/O      Description: Tonsillectomy;  Recorded: 12/18/2007;     [facility] APPENDECTOMY      Description: Appendectomy;  Recorded: 12/18/2007;     Family History   Problem Relation Age of Onset     Hypertension Father      Breast Cancer No family hx of         Social History     Socioeconomic History     Marital status:      Spouse name: Not on file     Number of children: Not on file     Years of education: Not on file     Highest education level: Not on file   Occupational History     Not on file   Tobacco Use     Smoking status: Former Smoker     Smokeless tobacco: Never Used   Substance and Sexual Activity     Alcohol use: Not on file     Drug use: Not on file     Sexual activity: Not on file   Other Topics  Concern     Not on file   Social History Narrative     Not on file     Social Determinants of Health     Financial Resource Strain: Not on file   Food Insecurity: Not on file   Transportation Needs: Not on file   Physical Activity: Not on file   Stress: Not on file   Social Connections: Not on file   Intimate Partner Violence: Not on file   Housing Stability: Not on file

## 2022-05-31 NOTE — LETTER
5/31/2022    Celeste Jackson MD  [address]  [facility] [address]    RE: Edsonnina CUMMINGS Bridget       Dear Colleague,     I had the pleasure of seeing Hector Gill in the [facility].         [facility]   [address], [place] [address]   [email or web address]   OFFICE: [phone]          Thank you Celeste Chaparro for asking the [facility] team to participate in the care of your patient, Hector Gill.     Impression and Plan     1. Atrial fibrillation (permanent). Patient with history of permanent atrial fibrillation. As noted below, atrial fibrillation was initially diagnosed approximately 10-15 years ago when she had presented for a colonoscopy.      Over the last decade, she has had intermittent palpitations consistent with paroxysmal atrial fibrillation.  Typically, however, her atrial fibrillation has been incidentally picked up on examinations.  She is for the most part completely asymptomatic with the rhythm disturbance.   [id number] score is 4-5.     Holter monitor 7 June 2021 revealed well-controlled ventricular response. Plan:    Continue rivaroxaban (Xarelto ) 20 mg daily for CVA prophylaxis.     Continue metoprolol succinate 200 mg daily.     2.    Cardiomyopathy.  Echocardiogram 25 May 2021 suggested mild-moderate reduction left ventricular systolic performance though depression and LV function may have been some months.  Since that she was in atrial fibrillation clinic.  We did discuss obtaining a repeat assessment of LV function though she wanted to hold off on any testing at this time.  May consider at follow-up next year.    3.  Aortic insufficiency.  This is felt mild on most recent echocardiogram 25 May 2021    4.  Hypertension.  Blood pressure is fairly reasonable in the office today.     Plan on follow-up in 1 year.    35 minutes spent reviewing prior records (including documentation,  laboratory studies, cardiac testing/imaging), interview with patient along with physical exam, planning, and subsequent documentation/crafting of note).           History of Present Illness    Once again I would like to thank you again for asking me to participate in the care of your patient, Hector Gill.  As you know, but to reiterate for my own records, Hector Gill is a 81 year old female with permanent atrial fibrillation. She specifically denies any shortness of breath, chest pain, or subjective decline in her exercise tolerance. She denies any lightheadedness.  She reports no fevers, chills, or other constitutional symptoms.    Further review of systems is otherwise negative/noncontributory (medical record and 13 point review of systems reviewed as well and pertinent positives noted).         Cardiac Diagnostics      Echocardiogram 25 May 2021:  1. Normal left ventricular size with mild-moderate reduction left ventricular systolic performance.  Ejection fraction 40-45%.  2. Mild aortic insufficiency.  3. Normal right ventricular size and systolic performance.  4. Moderate biatrial enlargement.     Echocardiogram 17 June 2016 (personally reviewed):  1. Normal left ventricular size and systolic performance with ejection fraction of 55%.  2. Mild aortic insufficiency.    Holter monitor 7 June 2021:  1. Persistent atrial fibrillation with controlled ventricular response.    2. Right bundle branch block noted.    Holter monitor 24 September 2018:   1. Abnormal Holter monitor tracing by virtue the persistent atrial fibrillation demonstrated throughout the recording.  2. Regular response appears to be well controlled in atrial fibrillation.  3. The patient reported no symptoms.  4. No significant ventricular arrhythmia  5. No profound bradycardia or pauses.     12-lead ECG (personally reviewed) 5/24/2021: Atrial fibrillation with heart rate of 110s bpm.  Right bundle branch block.     12-lead ECG  (personally reviewed) 10 September 2018: Atrial fibrillation with heart rate of 112 bpm.  Right bundle branch block.     12-lead ECG (personally reviewed) 26 May 2016: Sinus rhythm with occasional PACs. Incomplete right bundle branch block. Mild nonspecific T-wave changes.     12-lead ECG (personally reviewed) 24 May 2016: Atrial fibrillation/flutter. Right bundle branch block.     12-lead ECG (personally reviewed) 27 October 2005: Normal sinus rhythm. PACs. Right bundle branch block.         Physical Examination       /82 (BP Location: Right arm, Patient Position: Sitting, Cuff Size: Adult Regular)   Pulse 90   Resp 12   Wt 64.7 kg (142 lb 9.6 oz)   BMI 22.33 kg/m          Wt Readings from Last 3 Encounters:   05/31/22 64.7 kg (142 lb 9.6 oz)   05/05/22 65.9 kg (145 lb 4.8 oz)   05/24/21 66.7 kg (147 lb)       The patient is alert and oriented times three. Sclerae are anicteric. Mucosal membranes are moist. Jugular venous pressure is normal. No significant adenopathy/thyromegally appreciated. Lungs are clear with good expansion. On cardiovascular exam, the patient has an irregular S1 and S2. Abdomen is soft and non-tender. Extremities reveal no clubbing, cyanosis, or edema.       Medications  Allergies   Current Outpatient Medications   Medication Sig Dispense Refill     alendronate (FOSAMAX) 70 MG tablet Take 1 tablet (70 mg) by mouth every 7 days 14 tablet 4     cholecalciferol, vitamin D3, (VITAMIN D3) 2,000 unit capsule [CHOLECALCIFEROL, VITAMIN D3, (VITAMIN D3) 2,000 UNIT CAPSULE] Take 1,000 Units by mouth daily.       coenzyme Q10 (CO Q-10) 10 mg capsule [COENZYME Q10 (CO Q-10) 10 MG CAPSULE] Take 70 mg by mouth daily.       metoprolol succinate ER (TOPROL XL) 200 MG 24 hr tablet Take 1 tablet (200 mg) by mouth daily 90 tablet 3     multivitamin with minerals (THERA-M) 9 mg iron-400 mcg Tab tablet [MULTIVITAMIN WITH MINERALS (THERA-M) 9 MG IRON-400 MCG TAB TABLET] Take 1 tablet by mouth daily.        omega-3s/dha/epa/fish oil (OMEGA 3 ORAL) [OMEGA-3S/DHA/EPA/FISH OIL (OMEGA 3 ORAL)] Take by mouth daily.       rivaroxaban ANTICOAGULANT (XARELTO ANTICOAGULANT) 20 MG TABS tablet Take 1 tablet (20 mg) by mouth daily (with dinner) 90 tablet 3     No Known Allergies       Lab Results    Chemistry/lipid CBC Cardiac Enzymes/BNP/TSH/INR   Recent Labs   Lab Test 05/05/22  1135   CHOL 206*   HDL 71      TRIG 67     Recent Labs   Lab Test 05/05/22  1135 04/07/21  1050 05/16/19  1527    112 128     Recent Labs   Lab Test 05/05/22  1135      POTASSIUM 4.2   CHLORIDE 103   CO2 24      BUN 18   CR 0.79   GFRESTIMATED 76   DOMENIC 9.3     Recent Labs   Lab Test 05/05/22  1135 04/07/21  1050   CR 0.79 0.83     Recent Labs   Lab Test 05/05/22  1135 04/07/21  1050 05/16/19  1527   A1C 5.1 4.9 5.6          Recent Labs   Lab Test 04/07/21  1050   WBC 5.9   HGB 14.7   HCT 43.7   MCV 90        Recent Labs   Lab Test 04/07/21  1050 05/17/18  0938   HGB 14.7 14.3    No results for input(s): TROPONINI in the last 96177 hours.  No results for input(s): BNP, NTBNPI, NTBNP in the last 33555 hours.  No results for input(s): TSH in the last 82420 hours.  No results for input(s): INR in the last 40013 hours.     Medical History  Surgical History Family History Social History   Past Medical History:   Diagnosis Date     Inverted nipple      Past Surgical History:   Procedure Laterality Date     HC REMOVAL OF TONSILS,<13 Y/O      Description: Tonsillectomy;  Recorded: 12/18/2007;     ZZC APPENDECTOMY      Description: Appendectomy;  Recorded: 12/18/2007;     Family History   Problem Relation Age of Onset     Hypertension Father      Breast Cancer No family hx of         Social History     Socioeconomic History     Marital status:      Spouse name: Not on file     Number of children: Not on file     Years of education: Not on file     Highest education level: Not on file   Occupational History     Not on file    Tobacco Use     Smoking status: Former Smoker     Smokeless tobacco: Never Used   Substance and Sexual Activity     Alcohol use: Not on file     Drug use: Not on file     Sexual activity: Not on file   Other Topics Concern     Not on file   Social History Narrative     Not on file     Social Determinants of Health     Financial Resource Strain: Not on file   Food Insecurity: Not on file   Transportation Needs: Not on file   Physical Activity: Not on file   Stress: Not on file   Social Connections: Not on file   Intimate Partner Violence: Not on file   Housing Stability: Not on file                      Thank you for allowing me to participate in the care of your patient.      Sincerely,     Anirudh Curiel MD     [facility]  cc:   Referred Self,

## 2022-07-25 ENCOUNTER — HOSPITAL ENCOUNTER (OUTPATIENT)
Dept: MAMMOGRAPHY | Facility: CLINIC | Age: 82
Discharge: HOME | End: 2022-07-25
Attending: FAMILY MEDICINE | Admitting: FAMILY MEDICINE
Payer: COMMERCIAL

## 2022-07-25 DIAGNOSIS — Z12.31 VISIT FOR SCREENING MAMMOGRAM: ICD-10-CM

## 2022-07-25 PROCEDURE — 77067 SCR MAMMO BI INCL CAD: CPT

## 2022-10-01 ENCOUNTER — HEALTH MAINTENANCE LETTER (OUTPATIENT)
Age: 82
End: 2022-10-01

## 2023-01-25 ENCOUNTER — OFFICE VISIT (OUTPATIENT)
Dept: FAMILY MEDICINE | Facility: CLINIC | Age: 83
End: 2023-01-25
Payer: COMMERCIAL

## 2023-01-25 ENCOUNTER — NURSE TRIAGE (OUTPATIENT)
Dept: NURSING | Facility: CLINIC | Age: 83
End: 2023-01-25
Payer: COMMERCIAL

## 2023-01-25 VITALS
RESPIRATION RATE: 16 BRPM | DIASTOLIC BLOOD PRESSURE: 98 MMHG | HEART RATE: 116 BPM | TEMPERATURE: 98.4 F | OXYGEN SATURATION: 97 % | SYSTOLIC BLOOD PRESSURE: 160 MMHG

## 2023-01-25 DIAGNOSIS — R04.0 EPISTAXIS: Primary | ICD-10-CM

## 2023-01-25 PROCEDURE — 30901 CONTROL OF NOSEBLEED: CPT | Performed by: PHYSICIAN ASSISTANT

## 2023-01-25 ASSESSMENT — ENCOUNTER SYMPTOMS: CONSTITUTIONAL NEGATIVE: 1

## 2023-01-25 NOTE — PROGRESS NOTES
Assessment & Plan       ICD-10-CM    1. Epistaxis  R04.0          Upon looking in the nose, scabbed area noted.  Area cleaned with sterile water.  As I was able to locate the area of bleeding we were able to cauterize the area using silver nitrate.  Bacitracin placed over the area.  Yue instructed to apply Vaseline 1-2 times per day on the area, nasal saline spray, and if needed Afrin but this is to not be used for more than 3 days in a row.    Return if symptoms worsen or fail to improve, for Follow up.    At the end of the encounter, I discussed results, diagnosis, medications. Discussed red flags for immediate return to clinic/ER, as well as indications for follow up if no improvement. Patient understood and agreed to plan. Patient was stable for discharge.    Subjective     Yue is a 82 year old female who presents to clinic today for the following health issues:  Chief Complaint   Patient presents with     Nose Bleeds     Nose bleeding since yesterday comes and goes      Yue presents with reports of subtle nosebleed over the past 24 hours.  She states that she would feel a drip from the front portion of her nose.  She was able to control the bleeding and states it was more of a drip.  It continued to occur off and on.  She is not on any blood thinners.          Review of Systems   Constitutional: Negative.    HENT: Positive for nosebleeds.    Skin: Negative.        Problem List:  2017-05: Paroxysmal atrial fibrillation (H)  Hyperlipidemia  Impaired Fasting Glucose  Anxiety  Postmenopausal Osteoporosis      Past Medical History:   Diagnosis Date     Inverted nipple        Social History     Tobacco Use     Smoking status: Former     Smokeless tobacco: Never   Substance Use Topics     Alcohol use: Not on file           Objective    BP (!) 160/98   Pulse 116   Temp 98.4  F (36.9  C) (Oral)   Resp 16   SpO2 97%   Physical Exam  Constitutional:       Appearance: Normal appearance.   HENT:      Head:  Normocephalic and atraumatic.      Nose:      Comments: Scab present in left anterior nose.  No current epistaxis present.  Able to locate source of nosebleeds.  Musculoskeletal:      Cervical back: Normal range of motion and neck supple.   Skin:     General: Skin is warm and dry.   Neurological:      General: No focal deficit present.      Mental Status: She is alert and oriented to person, place, and time.   Psychiatric:         Mood and Affect: Mood normal.         Thought Content: Thought content normal.              Anders Gallegos PA-C

## 2023-01-25 NOTE — TELEPHONE ENCOUNTER
Triage Call:     Pt calling to report that she has had a bloody nose since yesterday that is continually dripping. Pt is on a blood thinner; xarelto.     She has tried the correct methods to stop her bloody nose, but it has not stopped.     Pt plans to go to the [facility] if there are no clinic appts. She was given the care advice and was transferred to [facility] to check appt availability.     Adelia Calderon RN  [facility] Nurse Advisor 8:34 AM 1/25/2023    Reason for Disposition    Bleeding present > 30 minutes and using correct method of direct pressure    Additional Information    Negative: Fainted (passed out), or too weak to stand following large blood loss    Negative: Sounds like a life-threatening emergency to the triager    Negative: Nosebleed followed nose injury    Negative: Bleeding now and second call after being instructed in correct technique of direct pressure    Negative: Lightheadedness or dizziness    Negative: Pale skin (pallor) of new-onset or worsening    Negative: Has nasal packing (inserted by health care provider to control bleeding) and now has new rash    Negative: Has nasal packing and now has bleeding around the packing  (Exception: Few drops or ooze.)    Negative: Patient sounds very sick or weak to the triager    Negative: Large amount of blood has been lost (e.g., one cup)    Protocols used: NOSEBLEED-A-OH

## 2023-01-27 ENCOUNTER — TELEPHONE (OUTPATIENT)
Dept: NURSING | Facility: CLINIC | Age: 83
End: 2023-01-27
Payer: COMMERCIAL

## 2023-01-27 ENCOUNTER — NURSE TRIAGE (OUTPATIENT)
Dept: NURSING | Facility: CLINIC | Age: 83
End: 2023-01-27
Payer: COMMERCIAL

## 2023-01-27 NOTE — TELEPHONE ENCOUNTER
Dr. Curiel,  Please see nurse triage note below.  Do you have any new orders or recommendations?  Thank you,  Grace  Follow up is planned in May 2023.

## 2023-01-27 NOTE — TELEPHONE ENCOUNTER
I will defer to cardiology re: anticoagulation.  If persisting nose bleeds, she can purse evaluation while in [place]. The humidity in [place] should be very helpful for her nosebleeds.  LUCIAJ

## 2023-01-27 NOTE — TELEPHONE ENCOUNTER
Provider Recommendation Follow Up:   Unable to reach patient/caregiver. Left message to return call to [phone]. Upon return call please notify caller of provider's recommendations.    Left message to return call. Please relay provider's message to pt when she returns call or route to RN queue with questions.    Anita Bullard, KENNN, RN  [facility]

## 2023-01-27 NOTE — TELEPHONE ENCOUNTER
Nurse Triage SBAR    Is this a 2nd Level Triage? Yes  - routed encounter to Cardiology Care Team.    Situation/Background: Patient returning call to [facility]; patient spoke with FNA Triage regarding recurring nosebleeds, encounter was routed to PCP/Care Team.     Relayed information from Dr. Jackson as stated below. Patient with question if there is concern with her Eliquis and nosebleeds.     Relayed information from Dr. Jackson. Routed encounter to Cardiology Provider Care Team.     Katie Llanos RN on 1/27/2023 at 2:47 PM  [facility] Nurse Advisors

## 2023-01-27 NOTE — TELEPHONE ENCOUNTER
Nurse Triage SBAR    Is this a 2nd Level Triage? YES, LICENSED PRACTITIONER REVIEW IS REQUIRED    Situation: pt continues to have some mild nosebleeds    Background:   Pt states she's had cold symptoms, nasal congestion for 2-3 weeks after [place]. Has been blowing her nose constantly.    Epistaxis started on 1/24. Then seen at [facility] on 1/25 and had nose cauterized.    Pt on Xarelto for a-fib    Assessment:     Nose randomly bleeds, will bleed a few drops and stops immediately. FNA advised that some bleeding can still occur after cautery.    Pt is concerned as she is on Xarelto and will be leaving for [place] on Sunday, pt will be staying out of state [place] 2 months.    Protocol Recommended Disposition:   See in Office Today    Recommendation:  Message sent to PCP/cardiology for recommendations.    Routed to provider  Please call pt for next steps [phone]    Does the patient meet one of the following criteria for ADS visit consideration? 16+ years old, with an [facility] PCP     TIP  Providers, please consider if this condition is appropriate for management at one of our Acute and Diagnostic Services sites.     If patient is a good candidate, please use dotphrase <dot>triageresponse and select Refer to ADS to document.    Josefina Franco RN/[facility] Nurse Advisor            Reason for Disposition    Taking Coumadin (warfarin) or other strong blood thinner, or known bleeding disorder (e.g., thrombocytopenia)    Additional Information    Negative: Fainted (passed out), or too weak to stand following large blood loss    Negative: Sounds like a life-threatening emergency to the triager    Negative: Nosebleed followed nose injury    Negative: Bleeding present > 30 minutes and using correct method of direct pressure    Negative: Bleeding now and second call after being instructed in correct technique of direct pressure    Negative: Lightheadedness or dizziness    Negative: Pale skin (pallor) of new-onset or worsening     Negative: Has nasal packing (inserted by health care provider to control bleeding) and now has new rash    Negative: Has nasal packing and now has bleeding around the packing  (Exception: Few drops or ooze.)    Negative: Patient sounds very sick or weak to the triager    Negative: Large amount of blood has been lost (e.g., one cup)    Negative: Bleeding recurs 3 or more times in 24 hours despite direct pressure    Protocols used: NOSEBLEED-A-OH

## 2023-01-27 NOTE — TELEPHONE ENCOUNTER
Error. Encounter opened in error.   Katie Llanos RN on 1/27/2023 at 2:38 PM  [facility] Nurse Advisors   normal... Health Care Proxy (HCP)

## 2023-04-15 DIAGNOSIS — I48.0 PAROXYSMAL ATRIAL FIBRILLATION (H): ICD-10-CM

## 2023-04-17 RX ORDER — RIVAROXABAN 20 MG/1
TABLET, FILM COATED ORAL
Qty: 90 TABLET | Refills: 3 | Status: SHIPPED | OUTPATIENT
Start: 2023-04-17 | End: 2023-05-09

## 2023-05-07 PROBLEM — I42.9 CARDIOMYOPATHY, UNSPECIFIED TYPE (H): Status: ACTIVE | Noted: 2023-05-07

## 2023-05-08 ENCOUNTER — OFFICE VISIT (OUTPATIENT)
Dept: FAMILY MEDICINE | Facility: CLINIC | Age: 83
End: 2023-05-08
Payer: COMMERCIAL

## 2023-05-08 VITALS
TEMPERATURE: 97.3 F | SYSTOLIC BLOOD PRESSURE: 132 MMHG | WEIGHT: 141.4 LBS | HEART RATE: 75 BPM | HEIGHT: 67 IN | DIASTOLIC BLOOD PRESSURE: 86 MMHG | RESPIRATION RATE: 16 BRPM | OXYGEN SATURATION: 99 % | BODY MASS INDEX: 22.19 KG/M2

## 2023-05-08 DIAGNOSIS — E78.5 HYPERLIPIDEMIA, UNSPECIFIED HYPERLIPIDEMIA TYPE: ICD-10-CM

## 2023-05-08 DIAGNOSIS — M81.0 SENILE OSTEOPOROSIS: ICD-10-CM

## 2023-05-08 DIAGNOSIS — I48.0 PAROXYSMAL ATRIAL FIBRILLATION (H): ICD-10-CM

## 2023-05-08 DIAGNOSIS — F41.1 ANXIETY STATE: ICD-10-CM

## 2023-05-08 DIAGNOSIS — I42.9 CARDIOMYOPATHY, UNSPECIFIED TYPE (H): ICD-10-CM

## 2023-05-08 DIAGNOSIS — Z00.00 MEDICARE ANNUAL WELLNESS VISIT, SUBSEQUENT: Primary | ICD-10-CM

## 2023-05-08 DIAGNOSIS — R73.01 IMPAIRED FASTING GLUCOSE: ICD-10-CM

## 2023-05-08 LAB
ALBUMIN SERPL BCG-MCNC: 4.4 G/DL (ref 3.5–5.2)
ALP SERPL-CCNC: 48 U/L (ref 35–104)
ALT SERPL W P-5'-P-CCNC: 11 U/L (ref 10–35)
ANION GAP SERPL CALCULATED.3IONS-SCNC: 11 MMOL/L (ref 7–15)
AST SERPL W P-5'-P-CCNC: 22 U/L (ref 10–35)
BILIRUB SERPL-MCNC: 1.1 MG/DL
BUN SERPL-MCNC: 18.3 MG/DL (ref 8–23)
CALCIUM SERPL-MCNC: 9.7 MG/DL (ref 8.8–10.2)
CHLORIDE SERPL-SCNC: 102 MMOL/L (ref 98–107)
CHOLEST SERPL-MCNC: 196 MG/DL
CREAT SERPL-MCNC: 0.91 MG/DL (ref 0.51–0.95)
DEPRECATED HCO3 PLAS-SCNC: 26 MMOL/L (ref 22–29)
ERYTHROCYTE [DISTWIDTH] IN BLOOD BY AUTOMATED COUNT: 13.2 % (ref 10–15)
GFR SERPL CREATININE-BSD FRML MDRD: 63 ML/MIN/1.73M2
GLUCOSE SERPL-MCNC: 124 MG/DL (ref 70–99)
HBA1C MFR BLD: 5 % (ref 0–5.6)
HCT VFR BLD AUTO: 43.8 % (ref 35–47)
HDLC SERPL-MCNC: 75 MG/DL
HGB BLD-MCNC: 15 G/DL (ref 11.7–15.7)
LDLC SERPL CALC-MCNC: 105 MG/DL
MCH RBC QN AUTO: 31.2 PG (ref 26.5–33)
MCHC RBC AUTO-ENTMCNC: 34.2 G/DL (ref 31.5–36.5)
MCV RBC AUTO: 91 FL (ref 78–100)
NONHDLC SERPL-MCNC: 121 MG/DL
PLATELET # BLD AUTO: 161 10E3/UL (ref 150–450)
POTASSIUM SERPL-SCNC: 4.5 MMOL/L (ref 3.4–5.3)
PROT SERPL-MCNC: 7.1 G/DL (ref 6.4–8.3)
RBC # BLD AUTO: 4.81 10E6/UL (ref 3.8–5.2)
SODIUM SERPL-SCNC: 139 MMOL/L (ref 136–145)
TRIGL SERPL-MCNC: 82 MG/DL
WBC # BLD AUTO: 5.9 10E3/UL (ref 4–11)

## 2023-05-08 PROCEDURE — G0439 PPPS, SUBSEQ VISIT: HCPCS | Performed by: FAMILY MEDICINE

## 2023-05-08 PROCEDURE — 82306 VITAMIN D 25 HYDROXY: CPT | Performed by: FAMILY MEDICINE

## 2023-05-08 PROCEDURE — 85027 COMPLETE CBC AUTOMATED: CPT | Performed by: FAMILY MEDICINE

## 2023-05-08 PROCEDURE — 99214 OFFICE O/P EST MOD 30 MIN: CPT | Mod: 25 | Performed by: FAMILY MEDICINE

## 2023-05-08 PROCEDURE — 83036 HEMOGLOBIN GLYCOSYLATED A1C: CPT | Performed by: FAMILY MEDICINE

## 2023-05-08 PROCEDURE — 80053 COMPREHEN METABOLIC PANEL: CPT | Performed by: FAMILY MEDICINE

## 2023-05-08 PROCEDURE — 36415 COLL VENOUS BLD VENIPUNCTURE: CPT | Performed by: FAMILY MEDICINE

## 2023-05-08 PROCEDURE — 80061 LIPID PANEL: CPT | Performed by: FAMILY MEDICINE

## 2023-05-08 RX ORDER — ALENDRONATE SODIUM 70 MG/1
70 TABLET ORAL
Qty: 14 TABLET | Refills: 4 | Status: SHIPPED | OUTPATIENT
Start: 2023-05-08 | End: 2024-05-10

## 2023-05-08 ASSESSMENT — ENCOUNTER SYMPTOMS
EYE PAIN: 0
DYSURIA: 0
MYALGIAS: 0
BREAST MASS: 0
DIARRHEA: 0
SHORTNESS OF BREATH: 0
FEVER: 0
CONSTIPATION: 0
SORE THROAT: 0
FREQUENCY: 1
NERVOUS/ANXIOUS: 1
HEADACHES: 0
WEAKNESS: 0
HEMATOCHEZIA: 0
JOINT SWELLING: 0
COUGH: 0
ABDOMINAL PAIN: 0
PARESTHESIAS: 0
HEARTBURN: 0
NAUSEA: 0
ARTHRALGIAS: 0
CHILLS: 0
DIZZINESS: 0
HEMATURIA: 0
PALPITATIONS: 0

## 2023-05-08 ASSESSMENT — ACTIVITIES OF DAILY LIVING (ADL): CURRENT_FUNCTION: NO ASSISTANCE NEEDED

## 2023-05-08 ASSESSMENT — PAIN SCALES - GENERAL: PAINLEVEL: NO PAIN (0)

## 2023-05-08 NOTE — PATIENT INSTRUCTIONS
Consider Shingrix (the new shingles vaccine, 2 dose series) to reduce your risk of shingles.  Check your insurance coverage first as many insurance companies prefer that this vaccine be administered at a pharmacy rather than a clinic.     Patient Education   Personalized Prevention Plan  You are due for the preventive services outlined below.  Your care team is available to assist you in scheduling these services.  If you have already completed any of these items, please share that information with your care team to update in your medical record.  Health Maintenance Due   Topic Date Due    Zoster (Shingles) Vaccine (2 of 3) 02/12/2008    ANNUAL REVIEW OF HM ORDERS  05/05/2023       Exercise for a Healthier Heart  You may wonder how you can improve the health of your heart. If you re thinking about exercise, you re on the right track. You don t need to become an athlete. But you do need a certain amount of brisk exercise to help strengthen your heart. If you have been diagnosed with a heart condition, your healthcare provider may advise exercise to help your condition. To help make exercise a habit, choose safe, fun activities.      Exercise with a friend. When activity is fun, you're more likely to stick with it.     Before you start  Check with your healthcare provider before starting an exercise program. This is especially important if you haven't been active for a while. It's also important if you have a long-term (chronic) health problem such as heart disease, diabetes, or obesity. Also check with your provider if you're at high risk for having these problems.   Why exercise?  Exercising regularly offers many healthy rewards. It can help you do all of these:   Improve your blood cholesterol level to help prevent further heart trouble.  Lower your blood pressure to help prevent a stroke or heart attack.  Control diabetes or reduce your risk of getting this disease.  Improve your heart and lung function.  Reach  and stay at a healthy weight.  Make your muscles stronger so you can stay active.  Prevent falls and fractures by slowing the loss of bone mass (osteoporosis).  Manage stress better.  Improve your sense of self and your body image.  Exercise tips    Ease into your routine. Set small goals. Then build on them. Talk with your healthcare provider first before starting an exercise routine if you're not sure what your activity level should be.  Exercise on most days. Aim for a total of at least 150 minutes (2 hours and 30 minutes) or more of moderate-intensity aerobic activity each week. You could also do 75 minutes (1 hour and 15 minutes) or more of vigorous-intensity aerobic activity each week. Or try for a combination of both. Moderate activity means that you breathe heavier and your heart rate increases, but you can still talk. Think about doing at least 30 minutes of moderate exercise, 5 times a week. It's OK to work up to the 30-minute period over time. Examples of moderate-intensity activity are brisk walking, gardening, and water aerobics.  Step up your daily activity level.  Along with your exercise program, try being more active the whole day. Walk instead of drive. Or park further away so that you take more steps each day. Do more household tasks or yard work. You may not be able to meet the advised amount of physical activity. But doing some moderate- or vigorous-intensity aerobic activity can help reduce your risk for heart disease. Your healthcare provider can help you figure out what is best for you.  Choose 1 or more activities you enjoy.  Walking is one of the easiest things you can do. You can also try swimming, riding a bike, dancing, or taking an exercise class.    Call 911  Call 911 right away if any of these occur:   Chest pain that doesn't go away quickly with rest  New burning, tightness, pressure, or heaviness in your chest, neck, shoulders, back, or arms  Abnormal or severe shortness of  breath  A very fast or irregular heartbeat (palpitations)  Fainting  When to call your healthcare provider  Call your healthcare provider if you have any of these:   Dizziness or lightheadedness  Mild shortness of breath or chest pain  Increased or new joint or muscle pain    Baldo last reviewed this educational content on 7/1/2022 2000-2022 [employer]. All rights reserved. This information is not intended as a substitute for professional medical care. Always follow your healthcare professional's instructions.          Understanding USDA MyPlate  The USDA has guidelines to help you make healthy food choices. These are called MyPlate. MyPlate shows the food groups that make up healthy meals using the image of a place setting. Before you eat, think about the healthiest choices for what to put on your plate or in your cup or bowl. To learn more about building a healthy plate, visit www.choosemyplate.gov.     The food groups  Fruits. Any fruit or 100% fruit juice counts as part of the Fruit Group. Fruits may be fresh, canned, frozen, or dried, and may be whole, cut-up, or pureed. Make 1/2 of your plate fruits and vegetables.  Vegetables. Any vegetable or 100% vegetable juice counts as a member of the Vegetable Group. Vegetables may be fresh, frozen, canned, or dried. They can be served raw or cooked and may be whole, cut-up, or mashed. Make 1/2 of your plate fruits and vegetables.  Grains. All foods made from grains are part of the Grains Group. These include wheat, rice, oats, cornmeal, and barley. Grains are often used to make foods such as bread, pasta, oatmeal, cereal, tortillas, and grits. Grains should be no more than 1/4 of your plate. At least half of your grains should be whole grains.  Protein. This group includes meat, poultry, seafood, beans and peas, eggs, processed soy products (such as tofu), nuts (including nut butters), and seeds. Make protein choices no more than 1/4 of your plate.  Meat and poultry choices should be lean or low fat.  Dairy. The Dairy Group includes all fluid milk products and foods made from milk that contain calcium, such as yogurt and cheese. (Foods that have little calcium, such as cream, butter, and cream cheese, are not part of this group.) Most dairy choices should be low-fat or fat-free.  Oils. Oils aren't a food group, but they do contain essential nutrients. However it's important to watch your intake of oils. These are fats that are liquid at room temperature. They include canola, corn, olive, soybean, vegetable, and sunflower oil. Foods that are mainly oil include mayonnaise, certain salad dressings, and soft margarines. You likely already get your daily oil allowance from the foods you eat.  Things to limit  Eating healthy also means limiting these things in your diet:  Salt (sodium). Many processed foods have a lot of sodium. To keep sodium intake down, eat fresh vegetables, meats, poultry, and seafood when possible. Purchase low-sodium, reduced-sodium, or no-salt-added food products at the store. And don't add salt to your meals at home. Instead, season them with herbs and spices such as dill, oregano, cumin, and paprika. Or try adding flavor with lemon or lime zest and juice.  Saturated fat. Saturated fats are most often found in animal products such as beef, pork, and chicken. They are often solid at room temperature, such as butter. To reduce your saturated fat intake, choose leaner cuts of meat and poultry. And try healthier cooking methods such as grilling, broiling, roasting, or baking. For a simple lower-fat swap, use plain nonfat yogurt instead of mayonnaise when making potato salad or macaroni salad.  Added sugars. These are sugars added to foods. They are in foods such as ice cream, candy, soda, fruit drinks, sports drinks, energy drinks, cookies, pastries, jams, and syrups. Cut down on added sugars by sharing sweet treats with a family member or  friend. You can also choose fruit for dessert, and drink water or other unsweetened beverages.  babberly last reviewed this educational content on 6/1/2020 2000-2022 [employer]. All rights reserved. This information is not intended as a substitute for professional medical care. Always follow your healthcare professional's instructions.          Signs of Hearing Loss  Hearing loss is a problem shared by many people. In fact, it's one of the most common health problems, particularly as people age. Most people aged 65 and older have some hearing loss. By age 80, almost everyone does. Hearing loss often occurs slowly over the years. So, you may not realize your hearing has gotten worse.   When sudden hearing loss occurs, it's important to contact your healthcare provider right away. Your provider will do a medical exam and a hearing exam as soon as possible. This is to help find the cause and type of your sudden hearing loss. Based on your diagnosis, your healthcare provider will discuss possible treatments.      Hearing much better with one ear can be a sign of hearing loss.     Have your hearing checked  Call your healthcare provider if you:   Have to strain to hear normal conversation  Have to watch other people s faces very carefully to follow what they re saying  Need to ask people to repeat what they ve said  Often misunderstand what people are saying  Turn the volume of the television or radio up so high that others complain  Feel that people are mumbling when they re talking to you  Find that the effort to hear leaves you feeling tired and irritated  Notice, when using the phone, that you hear better with one ear than the other  babberly last reviewed this educational content on 6/1/2022 2000-2022 [employer]. All rights reserved. This information is not intended as a substitute for professional medical care. Always follow your healthcare professional's instructions.

## 2023-05-08 NOTE — PROGRESS NOTES
"SUBJECTIVE:   Yue is a 82 year old who presents for Preventive Visit.       View : No data to display.              Patient has been advised of split billing requirements and indicates understanding: Yes  Are you in the first 12 months of your Medicare coverage?  No    Doing well over the last year without any new concerns.  Spends montes in [place].  Had a nosebleed just prior to leaving for [place] this year, had been taking antihistamines due to chronic rhinorrhea.  That has been doing better, is longer taking antihistamines.  History of cardiomyopathy with ejection fraction of 44%, followed by Dr. Curiel of cardiology.  This is been asymptomatic without any edema, shortness of breath, or exercise intolerance.  History of paroxysmal atrial fibrillation with previous history of ablation of permanent A-fib, remains on Xarelto and metoprolol with good effect.  Due for follow-up of impaired fasting glucose and dyslipidemia.  Remains on alendronate and management of osteoporosis.  No recent difficulties with anxiety.  Some struggles with hearing.    Healthy Habits:     In general, how would you rate your overall health?  Good    Frequency of exercise:  1 day/week    Duration of exercise:  N/A    Do you usually eat at least 4 servings of fruit and vegetables a day, include whole grains    & fiber and avoid regularly eating high fat or \"junk\" foods?  No    Taking medications regularly:  Yes    Medication side effects:  None    Ability to successfully perform activities of daily living:  No assistance needed    Home Safety:  No safety concerns identified    Hearing Impairment:  Difficulty following a conversation in a noisy restaurant or crowded room and find that men's voices are easier to understand than woman's    In the past 6 months, have you been bothered by leaking of urine?  No    In general, how would you rate your overall mental or emotional health?  Good      PHQ-2 Total Score: 0    Additional concerns " today:  No      Have you ever done Advance Care Planning? (For example, a Health Directive, POLST, or a discussion with a medical provider or your loved ones about your wishes): No, advance care planning information given to patient to review.  Advanced care planning was discussed at today's visit.       Fall risk  Fallen 2 or more times in the past year?: No  Any fall with injury in the past year?: No    Cognitive Screening   1) Repeat 3 items (Leader, Season, Table)    2) Clock draw: NORMAL  3) 3 item recall: Recalls 3 objects  Results: 3 items recalled: COGNITIVE IMPAIRMENT LESS LIKELY    Mini-CogTM Copyright S Gael. Licensed by the author for use in [facility]; reprinted with permission (soob@[email or web address]). All rights reserved.        Reviewed and updated as needed this visit by clinical staff   Tobacco  Allergies  Meds              Reviewed and updated as needed this visit by Provider                 Social History     Tobacco Use     Smoking status: Former     Smokeless tobacco: Never   Vaping Use     Vaping status: Never Used   Substance Use Topics     Alcohol use: Not on file             5/8/2023    10:09 AM   Alcohol Use   Prescreen: >3 drinks/day or >7 drinks/week? No     Do you have a current opioid prescription? No  Do you use any other controlled substances or medications that are not prescribed by a provider? None      Current providers sharing in care for this patient include:   Patient Care Team:  Celeste Jackson MD as PCP - General  Celeste Jackson MD as Assigned PCP  Anirudh Curiel MD as Assigned Heart and Vascular Provider    The following health maintenance items are reviewed in Epic and correct as of today:  Health Maintenance   Topic Date Due     ZOSTER IMMUNIZATION (2 of 3) 02/12/2008     ANNUAL REVIEW OF HM ORDERS  05/05/2023     MEDICARE ANNUAL WELLNESS VISIT  05/05/2023     DEXA  06/28/2023     FALL RISK ASSESSMENT  05/08/2024     DTAP/TDAP/TD IMMUNIZATION (2 - Td  or Tdap) 05/22/2025     LIPID  05/05/2027     ADVANCE CARE PLANNING  05/05/2027     PHQ-2 (once per calendar year)  Completed     INFLUENZA VACCINE  Completed     Pneumococcal Vaccine: 65+ Years  Completed     COVID-19 Vaccine  Completed     IPV IMMUNIZATION  Aged Out     MENINGITIS IMMUNIZATION  Aged Out     Lab work is in process  Labs reviewed in EPIC  BP Readings from Last 3 Encounters:   05/08/23 132/86   01/25/23 (!) 160/98   05/31/22 130/82    Wt Readings from Last 3 Encounters:   05/08/23 64.1 kg (141 lb 6.4 oz)   05/31/22 64.7 kg (142 lb 9.6 oz)   05/05/22 65.9 kg (145 lb 4.8 oz)                  Patient Active Problem List   Diagnosis     Hyperlipidemia     Impaired Fasting Glucose     Anxiety     Postmenopausal Osteoporosis     Paroxysmal atrial fibrillation (H)     Cardiomyopathy, unspecified type (H)     Past Surgical History:   Procedure Laterality Date     HC REMOVAL OF TONSILS,<11 Y/O      Description: Tonsillectomy;  Recorded: 12/18/2007;     [facility] APPENDECTOMY      Description: Appendectomy;  Recorded: 12/18/2007;       Social History     Tobacco Use     Smoking status: Former     Smokeless tobacco: Never   Vaping Use     Vaping status: Never Used   Substance Use Topics     Alcohol use: Not on file     Family History   Problem Relation Age of Onset     Hypertension Father      Breast Cancer No family hx of          Current Outpatient Medications   Medication Sig Dispense Refill     alendronate (FOSAMAX) 70 MG tablet Take 1 tablet (70 mg) by mouth every 7 days 14 tablet 4     cholecalciferol, vitamin D3, (VITAMIN D3) 2,000 unit capsule [CHOLECALCIFEROL, VITAMIN D3, (VITAMIN D3) 2,000 UNIT CAPSULE] Take 1,000 Units by mouth daily.       coenzyme Q10 (CO Q-10) 10 mg capsule [COENZYME Q10 (CO Q-10) 10 MG CAPSULE] Take 70 mg by mouth daily.       metoprolol succinate ER (TOPROL XL) 200 MG 24 hr tablet Take 1 tablet (200 mg) by mouth daily 90 tablet 3     multivitamin with minerals (THERA-M) 9 mg iron-400  "mcg Tab tablet [MULTIVITAMIN WITH MINERALS (THERA-M) 9 MG IRON-400 MCG TAB TABLET] Take 1 tablet by mouth daily.       omega-3s/dha/epa/fish oil (OMEGA 3 ORAL) [OMEGA-3S/DHA/EPA/FISH OIL (OMEGA 3 ORAL)] Take by mouth daily.       XARELTO ANTICOAGULANT 20 MG TABS tablet TAKE 1 TABLET BY MOUTH DAILY WITH SUPPER. 90 tablet 3     No Known Allergies  Recent Labs   Lab Test 05/05/22  1135 04/07/21  1050 05/16/19  1527   A1C 5.1 4.9 5.6    112 128   HDL 71 64 65   TRIG 67 76 79   CR 0.79 0.83  --    GFRESTIMATED 76 >60  --    GFRESTBLACK  --  >60  --    POTASSIUM 4.2 4.3  --         Pertinent mammograms are reviewed under the imaging tab.    Review of Systems   Constitutional: Negative for chills and fever.   HENT: Positive for hearing loss. Negative for congestion, ear pain and sore throat.    Eyes: Negative for pain and visual disturbance.   Respiratory: Negative for cough and shortness of breath.    Cardiovascular: Negative for chest pain, palpitations and peripheral edema.   Gastrointestinal: Negative for abdominal pain, constipation, diarrhea, heartburn, hematochezia and nausea.   Breasts:  Negative for tenderness, breast mass and discharge.   Genitourinary: Positive for frequency. Negative for dysuria, genital sores, hematuria, pelvic pain, urgency, vaginal bleeding and vaginal discharge.   Musculoskeletal: Negative for arthralgias, joint swelling and myalgias.   Skin: Negative for rash.   Neurological: Negative for dizziness, weakness, headaches and paresthesias.   Psychiatric/Behavioral: Negative for mood changes. The patient is nervous/anxious.          OBJECTIVE:   /86   Pulse 75   Temp 97.3  F (36.3  C) (Oral)   Resp 16   Ht 1.702 m (5' 7\")   Wt 64.1 kg (141 lb 6.4 oz)   SpO2 99%   BMI 22.15 kg/m   Estimated body mass index is 22.15 kg/m  as calculated from the following:    Height as of this encounter: 1.702 m (5' 7\").    Weight as of this encounter: 64.1 kg (141 lb 6.4 oz).  Physical " Exam  GENERAL APPEARANCE: healthy, alert and no distress  EYES: Eyes grossly normal to inspection, PERRL and conjunctivae and sclerae normal  HENT: ear canals and TM's normal, nose and mouth without ulcers or lesions, oropharynx clear and oral mucous membranes moist  NECK: no adenopathy, no asymmetry, masses, or scars and thyroid normal to palpation  RESP: lungs clear to auscultation - no rales, rhonchi or wheezes  BREAST: normal without masses, tenderness or nipple discharge and no palpable axillary masses or adenopathy  CV: regular rate and rhythm, normal S1 S2, no S3 or S4, no murmur, click or rub, no peripheral edema and peripheral pulses strong  ABDOMEN: soft, nontender, no hepatosplenomegaly, no masses and bowel sounds normal  MS: no musculoskeletal defects are noted and gait is age appropriate without ataxia  SKIN: no suspicious lesions or rashes  NEURO: Normal strength and tone, sensory exam grossly normal, mentation intact and speech normal  PSYCH: mentation appears normal and affect normal/bright    Diagnostic Test Results:  Labs reviewed in Epic    ASSESSMENT / PLAN:     Medicare annual wellness visit, subsequent  At today's visit, we discussed lifestyle interventions to promote self-management and wellness, including maintenance of a healthy weight, healthy diet, regular physical activity and exercise, and falls prevention.  Consider Shingrix.  Remainder vaccines up-to-date.  Discussed bone density screening.    Impaired fasting glucose  Encourage efforts at healthy lifestyle habits.  We will check A1c and fasting glucose today.  - Hemoglobin A1c; Future  - Comprehensive metabolic panel; Future    Hyperlipidemia, unspecified hyperlipidemia type  We will check kidney and liver function, lipids.  Encourage healthy lifestyle habits.  - Comprehensive metabolic panel; Future  - Lipid panel reflex to direct LDL Fasting; Future    Paroxysmal atrial fibrillation (H)  Remains on Xarelto for stroke prophylaxis,  metoprolol for rhythm suppression.  She has follow-up scheduled with Dr. Curiel tomorrow.  We will check CBC and monitoring of Xarelto.  - CBC with platelets; Future    Postmenopausal Osteoporosis  Encourage continued efforts at regular physical activity, measures to reduce risk of falls, and adequate calcium and vitamin D intake.  We will check vitamin D level.  Continue alendronate.  We will check bone density scan later this summer.  - alendronate (FOSAMAX) 70 MG tablet; Take 1 tablet (70 mg) by mouth every 7 days  - Vitamin D Deficiency; Future  - DX Hip/Pelvis/Spine; Future    Anxiety  Stable.    Cardiomyopathy, unspecified type (H)  Followed by cardiology, remains asymptomatic and euvolemic.    Patient has been advised of split billing requirements and indicates understanding: Yes      COUNSELING:  Reviewed preventive health counseling, as reflected in patient instructions        She reports that she has quit smoking. She has never used smokeless tobacco.      Appropriate preventive services were discussed with this patient, including applicable screening as appropriate for cardiovascular disease, diabetes, osteopenia/osteoporosis, and glaucoma.  As appropriate for age/gender, discussed screening for colorectal cancer, prostate cancer, breast cancer, and cervical cancer. Checklist reviewing preventive services available has been given to the patient.    Reviewed patients plan of care and provided an AVS. The Basic Care Plan (routine screening as documented in Health Maintenance) for Hector meets the Care Plan requirement. This Care Plan has been established and reviewed with the Patient.          Celeste Jackson MD  [facility]    Identified Health Risks:    I have reviewed Opioid Use Disorder and Substance Use Disorder risk factors and made any needed referrals.       She is at risk for lack of exercise and has been provided with information to increase physical activity for the  benefit of her well-being.  The patient was counseled and encouraged to consider modifying their diet and eating habits. She was provided with information on recommended healthy diet options.  The patient was provided with written information regarding signs of hearing loss.

## 2023-05-08 NOTE — LETTER
May 9, 2023      Yue Gill  [address] [facility] [address]        Dear ,    We are writing to inform you of your test results.    Your fasting blood sugar remains in the prediabetes range, your A1c, which measures your average blood sugar over the last 3 months, is in the normal range.  Continue to work on healthy lifestyle habits to reduce your risk of developing diabetes in the future.    Kidney and liver function and electrolytes.  Normal blood counts.  Your cholesterol looks good overall and has improved since last year.    Resulted Orders   Hemoglobin A1c   Result Value Ref Range    Hemoglobin A1C 5.0 0.0 - 5.6 %      Comment:      Normal <5.7%   Prediabetes 5.7-6.4%    Diabetes 6.5% or higher     Note: Adopted from ADA consensus guidelines.   Comprehensive metabolic panel   Result Value Ref Range    Sodium 139 136 - 145 mmol/L    Potassium 4.5 3.4 - 5.3 mmol/L    Chloride 102 98 - 107 mmol/L    Carbon Dioxide (CO2) 26 22 - 29 mmol/L    Anion Gap 11 7 - 15 mmol/L    Urea Nitrogen 18.3 8.0 - 23.0 mg/dL    Creatinine 0.91 0.51 - 0.95 mg/dL    Calcium 9.7 8.8 - 10.2 mg/dL    Glucose 124 (H) 70 - 99 mg/dL    Alkaline Phosphatase 48 35 - 104 U/L    AST 22 10 - 35 U/L    ALT 11 10 - 35 U/L    Protein Total 7.1 6.4 - 8.3 g/dL    Albumin 4.4 3.5 - 5.2 g/dL    Bilirubin Total 1.1 <=1.2 mg/dL    GFR Estimate 63 >60 mL/min/1.73m2      Comment:      eGFR calculated using 2021 CKD-EPI equation.   CBC with platelets   Result Value Ref Range    WBC Count 5.9 4.0 - 11.0 10e3/uL    RBC Count 4.81 3.80 - 5.20 10e6/uL    Hemoglobin 15.0 11.7 - 15.7 g/dL    Hematocrit 43.8 35.0 - 47.0 %    MCV 91 78 - 100 fL    MCH 31.2 26.5 - 33.0 pg    MCHC 34.2 31.5 - 36.5 g/dL    RDW 13.2 10.0 - 15.0 %    Platelet Count 161 150 - 450 10e3/uL   Lipid panel reflex to direct LDL Fasting   Result Value Ref Range    Cholesterol 196 <200 mg/dL    Triglycerides 82 <150 mg/dL    Direct Measure HDL 75 >=50 mg/dL    LDL Cholesterol  Calculated 105 (H) <=100 mg/dL    Non HDL Cholesterol 121 <130 mg/dL    Narrative    Cholesterol  Desirable:  <200 mg/dL    Triglycerides  Normal:  Less than 150 mg/dL  Borderline High:  150-199 mg/dL  High:  200-499 mg/dL  Very High:  Greater than or equal to 500 mg/dL    Direct Measure HDL  Female:  Greater than or equal to 50 mg/dL   Male:  Greater than or equal to 40 mg/dL    LDL Cholesterol  Desirable:  <100mg/dL  Above Desirable:  100-129 mg/dL   Borderline High:  130-159 mg/dL   High:  160-189 mg/dL   Very High:  >= 190 mg/dL    Non HDL Cholesterol  Desirable:  130 mg/dL  Above Desirable:  130-159 mg/dL  Borderline High:  160-189 mg/dL  High:  190-219 mg/dL  Very High:  Greater than or equal to 220 mg/dL       If you have any questions or concerns, please call the clinic at the number listed above.       Sincerely,      Celeste Jackson MD

## 2023-05-09 ENCOUNTER — OFFICE VISIT (OUTPATIENT)
Dept: CARDIOLOGY | Facility: CLINIC | Age: 83
End: 2023-05-09
Payer: COMMERCIAL

## 2023-05-09 VITALS
BODY MASS INDEX: 22.08 KG/M2 | RESPIRATION RATE: 16 BRPM | SYSTOLIC BLOOD PRESSURE: 134 MMHG | HEART RATE: 86 BPM | OXYGEN SATURATION: 100 % | WEIGHT: 141 LBS | DIASTOLIC BLOOD PRESSURE: 86 MMHG

## 2023-05-09 DIAGNOSIS — I48.21 PERMANENT ATRIAL FIBRILLATION (H): ICD-10-CM

## 2023-05-09 DIAGNOSIS — I42.9 CARDIOMYOPATHY, UNSPECIFIED TYPE (H): ICD-10-CM

## 2023-05-09 DIAGNOSIS — I10 HYPERTENSION, UNSPECIFIED TYPE: ICD-10-CM

## 2023-05-09 DIAGNOSIS — I48.0 PAROXYSMAL ATRIAL FIBRILLATION (H): ICD-10-CM

## 2023-05-09 DIAGNOSIS — I35.1 NONRHEUMATIC AORTIC VALVE INSUFFICIENCY: Primary | ICD-10-CM

## 2023-05-09 LAB — DEPRECATED CALCIDIOL+CALCIFEROL SERPL-MC: 65 UG/L (ref 20–75)

## 2023-05-09 PROCEDURE — 99214 OFFICE O/P EST MOD 30 MIN: CPT | Performed by: INTERNAL MEDICINE

## 2023-05-09 RX ORDER — METOPROLOL SUCCINATE 200 MG/1
200 TABLET, EXTENDED RELEASE ORAL DAILY
Qty: 90 TABLET | Refills: 3 | Status: SHIPPED | OUTPATIENT
Start: 2023-05-09 | End: 2024-07-01

## 2023-05-09 NOTE — LETTER
5/9/2023    Celeste Jackson MD  [address]  [facility] [address]    RE: Hector CUMMINGS Bridget       Dear Colleague,     I had the pleasure of seeing Hector Gill in the [facility].         [facility]   [address], [place] [address]   [email or web address]   OFFICE: [phone]          Thank you Celeste Chaparro for asking the [facility] team to participate in the care of your patient, Hector Gill.     Impression and Plan     1. Atrial fibrillation (permanent). Patient with history of permanent atrial fibrillation. As noted below, atrial fibrillation was initially diagnosed approximately 10-15 years ago when she had presented for a colonoscopy.      Over the last decade, she has had intermittent palpitations consistent with paroxysmal atrial fibrillation.  Typically, however, her atrial fibrillation has been incidentally picked up on examinations.  She is for the most part completely asymptomatic with the rhythm disturbance.   [id number] score is 4-5.     Holter monitor 7 June 2021 revealed well-controlled ventricular response. Plan:  Continue rivaroxaban (Xarelto ) 20 mg daily for CVA prophylaxis.   Continue metoprolol succinate 200 mg daily.     2.    Cardiomyopathy.  Echocardiogram 25 May 2021 suggested mild-moderate reduction in left ventricular systolic performance with ejection fraction of 40-45%.  Plan to obtain echocardiogram to reassess left ventricular systolic function.     3.  Aortic insufficiency.  This is felt mild on most recent echocardiogram 25 May 2021. Clinical exam does suggest perhaps some progression.  Echocardiogram as per problem #2.     4.  Hypertension.  Blood pressure is fairly reasonable in the office today.     Tentatively plan on follow-up in 1 year.        35 minutes spent reviewing prior records (including documentation, laboratory studies, cardiac testing/imaging), interview with  patient along with physical exam, planning, and subsequent documentation/crafting of note).           History of Present Illness    Once again I would like to thank you again for asking me to participate in the care of your patient, Hector Gill.  As you know, but to reiterate for my own records, Hector Gill is a 82 year old female with permanent atrial fibrillation. She specifically denies any shortness of breath, chest pain, or subjective decline in her exercise tolerance. She denies any lightheadedness.  She reports no fevers, chills, or other constitutional symptoms.  Overall, she is pleased with how she is performing from a cardiac standpoint.    Further review of systems is otherwise negative/noncontributory (medical record and 13 point review of systems reviewed as well and pertinent positives noted).         Cardiac Diagnostics      Echocardiogram 25 May 2021:  Normal left ventricular size with mild-moderate reduction left ventricular systolic performance.  Ejection fraction 40-45%.  Mild aortic insufficiency.  Normal right ventricular size and systolic performance.  Moderate biatrial enlargement.    Echocardiogram 17 June 2016 (personally reviewed):  Normal left ventricular size and systolic performance with ejection fraction of 55%.  Mild aortic insufficiency.    Holter monitor 7 June 2021:  Persistent atrial fibrillation with controlled ventricular response.    Right bundle branch block noted.    Holter monitor 24 September 2018:   Abnormal Holter monitor tracing by virtue the persistent atrial fibrillation demonstrated throughout the recording.  Regular response appears to be well controlled in atrial fibrillation.  The patient reported no symptoms.  No significant ventricular arrhythmia  No profound bradycardia or pauses.     12-lead ECG (personally reviewed) 5/24/2021: Atrial fibrillation with heart rate of 110s bpm.  Right bundle branch block.     12-lead ECG (personally reviewed) 10  September 2018: Atrial fibrillation with heart rate of 112 bpm.  Right bundle branch block.     12-lead ECG (personally reviewed) 26 May 2016: Sinus rhythm with occasional PACs. Incomplete right bundle branch block. Mild nonspecific T-wave changes.     12-lead ECG (personally reviewed) 24 May 2016: Atrial fibrillation/flutter. Right bundle branch block.     12-lead ECG (personally reviewed) 27 October 2005: Normal sinus rhythm. PACs. Right bundle branch block.           Physical Examination       /86 (BP Location: Right arm, Patient Position: Sitting, Cuff Size: Adult Regular)   Pulse 86   Resp 16   Wt 64 kg (141 lb)   SpO2 100%   BMI 22.08 kg/m          Wt Readings from Last 3 Encounters:   05/09/23 64 kg (141 lb)   05/08/23 64.1 kg (141 lb 6.4 oz)   05/31/22 64.7 kg (142 lb 9.6 oz)       The patient is alert and oriented times three. Sclerae are anicteric. Mucosal membranes are moist. Jugular venous pressure is normal. No significant adenopathy/thyromegally appreciated. Lungs are clear with good expansion. On cardiovascular exam, the patient has an irregular S1 and S2.  2-3/6 diastolic murmur noted.  Abdomen is soft and non-tender. Extremities reveal no clubbing, cyanosis, or edema.         Medications  Allergies   Current Outpatient Medications   Medication Sig Dispense Refill    alendronate (FOSAMAX) 70 MG tablet Take 1 tablet (70 mg) by mouth every 7 days 14 tablet 4    metoprolol succinate ER (TOPROL XL) 200 MG 24 hr tablet Take 1 tablet (200 mg) by mouth daily 90 tablet 3    multivitamin with minerals (THERA-M) 9 mg iron-400 mcg Tab tablet [MULTIVITAMIN WITH MINERALS (THERA-M) 9 MG IRON-400 MCG TAB TABLET] Take 1 tablet by mouth daily.      rivaroxaban ANTICOAGULANT (XARELTO ANTICOAGULANT) 20 MG TABS tablet Take 1 tablet (20 mg) by mouth daily (with dinner) 90 tablet 3     No Known Allergies       Lab Results    Chemistry/lipid CBC Cardiac Enzymes/BNP/TSH/INR   Recent Labs   Lab Test 05/08/23  1131    CHOL 196   HDL 75   *   TRIG 82     Recent Labs   Lab Test 05/08/23  1131 05/05/22  1135 04/07/21  1050   * 122 112     Recent Labs   Lab Test 05/08/23  1131      POTASSIUM 4.5   CHLORIDE 102   CO2 26   *   BUN 18.3   CR 0.91   GFRESTIMATED 63   DOMENIC 9.7     Recent Labs   Lab Test 05/08/23  1131 05/05/22  1135 04/07/21  1050   CR 0.91 0.79 0.83     Recent Labs   Lab Test 05/08/23  1131 05/05/22  1135 04/07/21  1050   A1C 5.0 5.1 4.9          Recent Labs   Lab Test 05/08/23  1131   WBC 5.9   HGB 15.0   HCT 43.8   MCV 91        Recent Labs   Lab Test 05/08/23  1131 04/07/21  1050 05/17/18  0938   HGB 15.0 14.7 14.3    No results for input(s): TROPONINI in the last 66759 hours.  No results for input(s): BNP, NTBNPI, NTBNP in the last 02907 hours.  No results for input(s): TSH in the last 28127 hours.  No results for input(s): INR in the last 74012 hours.     Medical History  Surgical History Family History Social History   Past Medical History:   Diagnosis Date    Inverted nipple      Past Surgical History:   Procedure Laterality Date    HC REMOVAL OF TONSILS,<13 Y/O      Description: Tonsillectomy;  Recorded: 12/18/2007;    ZC APPENDECTOMY      Description: Appendectomy;  Recorded: 12/18/2007;     Family History   Problem Relation Age of Onset    Hypertension Father     Breast Cancer No family hx of         Social History     Socioeconomic History    Marital status:      Spouse name: Not on file    Number of children: Not on file    Years of education: Not on file    Highest education level: Not on file   Occupational History    Not on file   Tobacco Use    Smoking status: Former    Smokeless tobacco: Never   Vaping Use    Vaping status: Never Used   Substance and Sexual Activity    Alcohol use: Not on file    Drug use: Not on file    Sexual activity: Not on file   Other Topics Concern    Not on file   Social History Narrative    Not on file     Social Determinants of Health      Financial Resource Strain: Not on file   Food Insecurity: Not on file   Transportation Needs: Not on file   Physical Activity: Not on file   Stress: Not on file   Social Connections: Not on file   Intimate Partner Violence: Not on file   Housing Stability: Not on file                      Thank you for allowing me to participate in the care of your patient.      Sincerely,     Anirudh Curiel MD     [facility]  cc:   Anirudh Curiel MD  [address]  [place] [id number]

## 2023-05-09 NOTE — PROGRESS NOTES
[facility]   [address], [place] [address]   [email or web address]   OFFICE: [phone]          Thank you Celeste Chaparro for asking the [facility] Care team to participate in the care of your patient, Hector Gill.     Impression and Plan     1. Atrial fibrillation (permanent). Patient with history of permanent atrial fibrillation. As noted below, atrial fibrillation was initially diagnosed approximately 10-15 years ago when she had presented for a colonoscopy.      Over the last decade, she has had intermittent palpitations consistent with paroxysmal atrial fibrillation.  Typically, however, her atrial fibrillation has been incidentally picked up on examinations.  She is for the most part completely asymptomatic with the rhythm disturbance.   [id number] score is 4-5.     Holter monitor 7 June 2021 revealed well-controlled ventricular response. Plan:  Continue rivaroxaban (Xarelto ) 20 mg daily for CVA prophylaxis.   Continue metoprolol succinate 200 mg daily.     2.    Cardiomyopathy.  Echocardiogram 25 May 2021 suggested mild-moderate reduction in left ventricular systolic performance with ejection fraction of 40-45%.  Plan to obtain echocardiogram to reassess left ventricular systolic function.     3.  Aortic insufficiency.  This is felt mild on most recent echocardiogram 25 May 2021. Clinical exam does suggest perhaps some progression.  Echocardiogram as per problem #2.     4.  Hypertension.  Blood pressure is fairly reasonable in the office today.     Tentatively plan on follow-up in 1 year.        35 minutes spent reviewing prior records (including documentation, laboratory studies, cardiac testing/imaging), interview with patient along with physical exam, planning, and subsequent documentation/crafting of note).           History of Present Illness    Once again I would like to thank you again for asking me to participate in the  care of your patient, Hector Gill.  As you know, but to reiterate for my own records, Hector Gill is a 82 year old female with permanent atrial fibrillation. She specifically denies any shortness of breath, chest pain, or subjective decline in her exercise tolerance. She denies any lightheadedness.  She reports no fevers, chills, or other constitutional symptoms.  Overall, she is pleased with how she is performing from a cardiac standpoint.    Further review of systems is otherwise negative/noncontributory (medical record and 13 point review of systems reviewed as well and pertinent positives noted).         Cardiac Diagnostics      Echocardiogram 25 May 2021:  Normal left ventricular size with mild-moderate reduction left ventricular systolic performance.  Ejection fraction 40-45%.  Mild aortic insufficiency.  Normal right ventricular size and systolic performance.  Moderate biatrial enlargement.    Echocardiogram 17 June 2016 (personally reviewed):  Normal left ventricular size and systolic performance with ejection fraction of 55%.  Mild aortic insufficiency.    Holter monitor 7 June 2021:  Persistent atrial fibrillation with controlled ventricular response.    Right bundle branch block noted.    Holter monitor 24 September 2018:   Abnormal Holter monitor tracing by virtue the persistent atrial fibrillation demonstrated throughout the recording.  Regular response appears to be well controlled in atrial fibrillation.  The patient reported no symptoms.  No significant ventricular arrhythmia  No profound bradycardia or pauses.     12-lead ECG (personally reviewed) 5/24/2021: Atrial fibrillation with heart rate of 110s bpm.  Right bundle branch block.     12-lead ECG (personally reviewed) 10 September 2018: Atrial fibrillation with heart rate of 112 bpm.  Right bundle branch block.     12-lead ECG (personally reviewed) 26 May 2016: Sinus rhythm with occasional PACs. Incomplete right bundle branch block.  Mild nonspecific T-wave changes.     12-lead ECG (personally reviewed) 24 May 2016: Atrial fibrillation/flutter. Right bundle branch block.     12-lead ECG (personally reviewed) 27 October 2005: Normal sinus rhythm. PACs. Right bundle branch block.           Physical Examination       /86 (BP Location: Right arm, Patient Position: Sitting, Cuff Size: Adult Regular)   Pulse 86   Resp 16   Wt 64 kg (141 lb)   SpO2 100%   BMI 22.08 kg/m          Wt Readings from Last 3 Encounters:   05/09/23 64 kg (141 lb)   05/08/23 64.1 kg (141 lb 6.4 oz)   05/31/22 64.7 kg (142 lb 9.6 oz)       The patient is alert and oriented times three. Sclerae are anicteric. Mucosal membranes are moist. Jugular venous pressure is normal. No significant adenopathy/thyromegally appreciated. Lungs are clear with good expansion. On cardiovascular exam, the patient has an irregular S1 and S2.  2-3/6 diastolic murmur noted.  Abdomen is soft and non-tender. Extremities reveal no clubbing, cyanosis, or edema.         Medications  Allergies   Current Outpatient Medications   Medication Sig Dispense Refill     alendronate (FOSAMAX) 70 MG tablet Take 1 tablet (70 mg) by mouth every 7 days 14 tablet 4     metoprolol succinate ER (TOPROL XL) 200 MG 24 hr tablet Take 1 tablet (200 mg) by mouth daily 90 tablet 3     multivitamin with minerals (THERA-M) 9 mg iron-400 mcg Tab tablet [MULTIVITAMIN WITH MINERALS (THERA-M) 9 MG IRON-400 MCG TAB TABLET] Take 1 tablet by mouth daily.       rivaroxaban ANTICOAGULANT (XARELTO ANTICOAGULANT) 20 MG TABS tablet Take 1 tablet (20 mg) by mouth daily (with dinner) 90 tablet 3     No Known Allergies       Lab Results    Chemistry/lipid CBC Cardiac Enzymes/BNP/TSH/INR   Recent Labs   Lab Test 05/08/23  1131   CHOL 196   HDL 75   *   TRIG 82     Recent Labs   Lab Test 05/08/23  1131 05/05/22  1135 04/07/21  1050   * 122 112     Recent Labs   Lab Test 05/08/23  1131      POTASSIUM 4.5   CHLORIDE  102   CO2 26   *   BUN 18.3   CR 0.91   GFRESTIMATED 63   DOMENIC 9.7     Recent Labs   Lab Test 05/08/23  1131 05/05/22  1135 04/07/21  1050   CR 0.91 0.79 0.83     Recent Labs   Lab Test 05/08/23  1131 05/05/22  1135 04/07/21  1050   A1C 5.0 5.1 4.9          Recent Labs   Lab Test 05/08/23  1131   WBC 5.9   HGB 15.0   HCT 43.8   MCV 91        Recent Labs   Lab Test 05/08/23  1131 04/07/21  1050 05/17/18  0938   HGB 15.0 14.7 14.3    No results for input(s): TROPONINI in the last 42059 hours.  No results for input(s): BNP, NTBNPI, NTBNP in the last 77403 hours.  No results for input(s): TSH in the last 46097 hours.  No results for input(s): INR in the last 39306 hours.     Medical History  Surgical History Family History Social History   Past Medical History:   Diagnosis Date     Inverted nipple      Past Surgical History:   Procedure Laterality Date     HC REMOVAL OF TONSILS,<11 Y/O      Description: Tonsillectomy;  Recorded: 12/18/2007;     ZZC APPENDECTOMY      Description: Appendectomy;  Recorded: 12/18/2007;     Family History   Problem Relation Age of Onset     Hypertension Father      Breast Cancer No family hx of         Social History     Socioeconomic History     Marital status:      Spouse name: Not on file     Number of children: Not on file     Years of education: Not on file     Highest education level: Not on file   Occupational History     Not on file   Tobacco Use     Smoking status: Former     Smokeless tobacco: Never   Vaping Use     Vaping status: Never Used   Substance and Sexual Activity     Alcohol use: Not on file     Drug use: Not on file     Sexual activity: Not on file   Other Topics Concern     Not on file   Social History Narrative     Not on file     Social Determinants of Health     Financial Resource Strain: Not on file   Food Insecurity: Not on file   Transportation Needs: Not on file   Physical Activity: Not on file   Stress: Not on file   Social Connections: Not  on file   Intimate Partner Violence: Not on file   Housing Stability: Not on file

## 2023-06-08 ENCOUNTER — HOSPITAL ENCOUNTER (OUTPATIENT)
Dept: CARDIOLOGY | Facility: CLINIC | Age: 83
Discharge: HOME | End: 2023-06-08
Attending: INTERNAL MEDICINE | Admitting: INTERNAL MEDICINE
Payer: COMMERCIAL

## 2023-06-08 DIAGNOSIS — I48.21 PERMANENT ATRIAL FIBRILLATION (H): ICD-10-CM

## 2023-06-08 LAB — LVEF ECHO: NORMAL

## 2023-06-08 PROCEDURE — 93306 TTE W/DOPPLER COMPLETE: CPT | Mod: 26 | Performed by: INTERNAL MEDICINE

## 2023-06-08 PROCEDURE — 93306 TTE W/DOPPLER COMPLETE: CPT

## 2023-06-20 ENCOUNTER — OFFICE VISIT (OUTPATIENT)
Dept: FAMILY MEDICINE | Facility: CLINIC | Age: 83
End: 2023-06-20
Payer: COMMERCIAL

## 2023-06-20 VITALS
SYSTOLIC BLOOD PRESSURE: 151 MMHG | DIASTOLIC BLOOD PRESSURE: 74 MMHG | RESPIRATION RATE: 16 BRPM | OXYGEN SATURATION: 97 % | HEART RATE: 73 BPM | TEMPERATURE: 98.4 F

## 2023-06-20 DIAGNOSIS — H61.23 BILATERAL IMPACTED CERUMEN: Primary | ICD-10-CM

## 2023-06-20 PROCEDURE — 69209 REMOVE IMPACTED EAR WAX UNI: CPT | Mod: 50 | Performed by: PHYSICIAN ASSISTANT

## 2023-06-20 NOTE — PROGRESS NOTES
Patient presents with:  Ear Problem: Clogged ear right       Clinical Decision Making:  Cerumen was removed as described below.  Patient is to follow-up on as-needed basis.  Had good improvement of hearing in the office.  Questions were answered to her satisfaction before discharge.      ICD-10-CM    1. Bilateral impacted cerumen  H61.23         See AVS for patient instructions.    HPI:  Hector Gill is a 82 year old female who presents today for evaluation and treatment of bilateral cerumen impaction with decreased hearing and fullness in the right ear.  Patient has not had otorrhea but has had decreased hearing on the right.  There has been problems with cerumen impaction in the past the patient is ostensibly asking for evaluation and removal of the wax.    History obtained from chart review and the patient.    Problem List:  2023-05: Cardiomyopathy, unspecified type (H)  2017-05: Paroxysmal atrial fibrillation (H)  Hyperlipidemia  Impaired Fasting Glucose  Anxiety  Postmenopausal Osteoporosis      Past Medical History:   Diagnosis Date     Inverted nipple        Social History     Tobacco Use     Smoking status: Former     Smokeless tobacco: Never   Vaping Use     Vaping status: Never Used   Substance Use Topics     Alcohol use: Not on file       Review of Systems  As above in HPI otherwise negative.    Vitals:    06/20/23 1443   BP: (!) 151/74   Pulse: 73   Resp: 16   Temp: 98.4  F (36.9  C)   TempSrc: Oral   SpO2: 97%       General: Patient is resting comfortably no acute distress is afebrile  HEENT: Head is normocephalic atraumatic   eyes are PERRL EOMI sclera anicteric   TMs are occluded by cerumen bilaterally  Throat is clear  No cervical lymphadenopathy present      Physical Exam   PROCEDURE NOTE:  Bilateral EAC are impacted with cerumen. Cerumen lavage was performed. TMs were visualized and intact and non-reactive.  External auditory canals were not irritated. Patient tolerated entire procedure  well.    At the end of the encounter, I discussed results, diagnosis, medications. Discussed red flags for immediate return to clinic/ER, as well as indications for follow up if no improvement. Patient understood and agreed to plan. Patient was stable for discharge.

## 2024-05-09 ENCOUNTER — OFFICE VISIT (OUTPATIENT)
Dept: FAMILY MEDICINE | Facility: CLINIC | Age: 84
End: 2024-05-09
Payer: COMMERCIAL

## 2024-05-09 VITALS
HEIGHT: 67 IN | HEART RATE: 96 BPM | DIASTOLIC BLOOD PRESSURE: 81 MMHG | WEIGHT: 140.2 LBS | BODY MASS INDEX: 22 KG/M2 | TEMPERATURE: 97.8 F | SYSTOLIC BLOOD PRESSURE: 138 MMHG | RESPIRATION RATE: 10 BRPM | OXYGEN SATURATION: 96 %

## 2024-05-09 DIAGNOSIS — I42.9 CARDIOMYOPATHY, UNSPECIFIED TYPE (H): ICD-10-CM

## 2024-05-09 DIAGNOSIS — I48.21 PERMANENT ATRIAL FIBRILLATION (H): ICD-10-CM

## 2024-05-09 DIAGNOSIS — E78.5 HYPERLIPIDEMIA, UNSPECIFIED HYPERLIPIDEMIA TYPE: ICD-10-CM

## 2024-05-09 DIAGNOSIS — R73.01 IMPAIRED FASTING GLUCOSE: ICD-10-CM

## 2024-05-09 DIAGNOSIS — M81.0 SENILE OSTEOPOROSIS: ICD-10-CM

## 2024-05-09 DIAGNOSIS — Z00.00 MEDICARE ANNUAL WELLNESS VISIT, SUBSEQUENT: Primary | ICD-10-CM

## 2024-05-09 LAB — HBA1C MFR BLD: 4.9 % (ref 0–5.6)

## 2024-05-09 PROCEDURE — 90480 ADMN SARSCOV2 VAC 1/ONLY CMP: CPT | Performed by: FAMILY MEDICINE

## 2024-05-09 PROCEDURE — G0439 PPPS, SUBSEQ VISIT: HCPCS | Performed by: FAMILY MEDICINE

## 2024-05-09 PROCEDURE — 99213 OFFICE O/P EST LOW 20 MIN: CPT | Mod: 25 | Performed by: FAMILY MEDICINE

## 2024-05-09 PROCEDURE — 82306 VITAMIN D 25 HYDROXY: CPT | Performed by: FAMILY MEDICINE

## 2024-05-09 PROCEDURE — 91320 SARSCV2 VAC 30MCG TRS-SUC IM: CPT | Performed by: FAMILY MEDICINE

## 2024-05-09 PROCEDURE — 36415 COLL VENOUS BLD VENIPUNCTURE: CPT | Performed by: FAMILY MEDICINE

## 2024-05-09 PROCEDURE — 80053 COMPREHEN METABOLIC PANEL: CPT | Performed by: FAMILY MEDICINE

## 2024-05-09 PROCEDURE — 83036 HEMOGLOBIN GLYCOSYLATED A1C: CPT | Performed by: FAMILY MEDICINE

## 2024-05-09 PROCEDURE — 80061 LIPID PANEL: CPT | Performed by: FAMILY MEDICINE

## 2024-05-09 RX ORDER — RESPIRATORY SYNCYTIAL VIRUS VACCINE 120MCG/0.5
0.5 KIT INTRAMUSCULAR ONCE
Qty: 1 EACH | Refills: 0 | Status: CANCELLED | OUTPATIENT
Start: 2024-05-09 | End: 2024-05-09

## 2024-05-09 SDOH — HEALTH STABILITY: PHYSICAL HEALTH: ON AVERAGE, HOW MANY DAYS PER WEEK DO YOU ENGAGE IN MODERATE TO STRENUOUS EXERCISE (LIKE A BRISK WALK)?: 3 DAYS

## 2024-05-09 ASSESSMENT — SOCIAL DETERMINANTS OF HEALTH (SDOH): HOW OFTEN DO YOU GET TOGETHER WITH FRIENDS OR RELATIVES?: MORE THAN THREE TIMES A WEEK

## 2024-05-09 ASSESSMENT — PAIN SCALES - GENERAL: PAINLEVEL: NO PAIN (0)

## 2024-05-09 NOTE — LETTER
May 10, 2024      Yue CUMMINGS Bridget  [address] [facility] [address]        Dear ,    We are writing to inform you of your test results.    Your cholesterol looks good overall.  Normal kidney function, liver function, and electrolytes.  Blood sugar was normal since you were not fasting at the time of your blood draw.  Your vitamin D level looks good, it is okay for this to run just a little bit high.  Your A1c, which measures your average blood sugar over the last 3 months, is in the normal range, no signs of diabetes.    Resulted Orders   Lipid panel reflex to direct LDL Non-fasting   Result Value Ref Range    Cholesterol 206 (H) <200 mg/dL    Triglycerides 75 <150 mg/dL    Direct Measure HDL 78 >=50 mg/dL    LDL Cholesterol Calculated 113 (H) <=100 mg/dL    Non HDL Cholesterol 128 <130 mg/dL    Patient Fasting > 8hrs? No     Narrative    Cholesterol  Desirable:  <200 mg/dL    Triglycerides  Normal:  Less than 150 mg/dL  Borderline High:  150-199 mg/dL  High:  200-499 mg/dL  Very High:  Greater than or equal to 500 mg/dL    Direct Measure HDL  Female:  Greater than or equal to 50 mg/dL   Male:  Greater than or equal to 40 mg/dL    LDL Cholesterol  Desirable:  <100mg/dL  Above Desirable:  100-129 mg/dL   Borderline High:  130-159 mg/dL   High:  160-189 mg/dL   Very High:  >= 190 mg/dL    Non HDL Cholesterol  Desirable:  130 mg/dL  Above Desirable:  130-159 mg/dL  Borderline High:  160-189 mg/dL  High:  190-219 mg/dL  Very High:  Greater than or equal to 220 mg/dL   Comprehensive metabolic panel   Result Value Ref Range    Sodium 139 135 - 145 mmol/L      Comment:      Reference intervals for this test were updated on 09/26/2023 to more accurately reflect our healthy population. There may be differences in the flagging of prior results with similar values performed with this method. Interpretation of those prior results can be made in the context of the updated reference intervals.     Potassium 4.6 3.4  - 5.3 mmol/L    Carbon Dioxide (CO2) 25 22 - 29 mmol/L    Anion Gap 13 7 - 15 mmol/L    Urea Nitrogen 17.2 8.0 - 23.0 mg/dL    Creatinine 0.90 0.51 - 0.95 mg/dL    GFR Estimate 64 >60 mL/min/1.73m2    Calcium 10.0 8.8 - 10.2 mg/dL    Chloride 101 98 - 107 mmol/L    Glucose 122 (H) 70 - 99 mg/dL    Alkaline Phosphatase 53 40 - 150 U/L      Comment:      Reference intervals for this test were updated on 11/14/2023 to more accurately reflect our healthy population. There may be differences in the flagging of prior results with similar values performed with this method. Interpretation of those prior results can be made in the context of the updated reference intervals.    AST 23 0 - 45 U/L      Comment:      Reference intervals for this test were updated on 6/12/2023 to more accurately reflect our healthy population. There may be differences in the flagging of prior results with similar values performed with this method. Interpretation of those prior results can be made in the context of the updated reference intervals.    ALT 12 0 - 50 U/L      Comment:      Reference intervals for this test were updated on 6/12/2023 to more accurately reflect our healthy population. There may be differences in the flagging of prior results with similar values performed with this method. Interpretation of those prior results can be made in the context of the updated reference intervals.      Protein Total 7.3 6.4 - 8.3 g/dL    Albumin 4.6 3.5 - 5.2 g/dL    Bilirubin Total 1.3 (H) <=1.2 mg/dL    Patient Fasting > 8hrs? No    Hemoglobin A1c   Result Value Ref Range    Hemoglobin A1C 4.9 0.0 - 5.6 %      Comment:      Normal <5.7%   Prediabetes 5.7-6.4%    Diabetes 6.5% or higher     Note: Adopted from ADA consensus guidelines.   Vitamin D Deficiency   Result Value Ref Range    Vitamin D, Total (25-Hydroxy) 61 (H) 20 - 50 ng/mL      Comment:      indicates supplementation, with increased risk of hypercalciuria    Narrative    Season,  race, dietary intake, and treatment affect the concentration of 25-hydroxy-Vitamin D. Values may decrease during winter months and increase during summer months.    Vitamin D determination is routinely performed by an immunoassay specific for 25 hydroxyvitamin D3.  If an individual is on vitamin D2(ergocalciferol) supplementation, please specify 25 OH vitamin D2 and D3 level determination by LCMSMS test [id number].         If you have any questions or concerns, please call the clinic at the number listed above.       Sincerely,      Celeste Jackson MD

## 2024-05-09 NOTE — PROGRESS NOTES
Preventive Care Visit  [facility]  Celeste Jackson MD, Family Medicine  May 9, 2024      Assessment & Plan     Medicare annual wellness visit, subsequent  At today's visit, we discussed lifestyle interventions to promote self-management and wellness, including maintenance of a healthy weight, healthy diet, regular physical activity and exercise, and falls prevention.  COVID booster administered today.  Discussed RSV vaccine, she will consider.  Will check nonfasting lipids, will check A1c to screen for diabetes.  Order placed for follow-up bone density scan.  Discussed consideration of mammogram, she would like to continue for now.    Permanent atrial fibrillation (H)  Rate controlled, continue metoprolol.  Anticoagulated with Xarelto.  She will continue follow-up with cardiology.  - Comprehensive metabolic panel; Future  - Comprehensive metabolic panel    Cardiomyopathy, unspecified type (H)  Euvolemic, most recent echocardiogram with normal ejection fraction.  She will continue to follow with cardiology.  Mild elevation of blood pressure today but overall reasonable in light of patient's age.  Will keep an eye on this.    Impaired Fasting Glucose  Encourage efforts at healthy lifestyle habits.  Will check kidney and liver function.  Since she is not fasting, will also check A1c today.  - Comprehensive metabolic panel; Future  - Hemoglobin A1c; Future  - Comprehensive metabolic panel  - Hemoglobin A1c    Hyperlipidemia, unspecified hyperlipidemia type  Encourage efforts at healthy lifestyle habits including measures to reduce risk of falls, weightbearing activities, adequate calcium and vitamin D intake..  Will check vitamin D level.  Order placed for follow-up bone density scan.    - Lipid panel reflex to direct LDL Non-fasting; Future  - Comprehensive metabolic panel; Future  - Lipid panel reflex to direct LDL Non-fasting  - Comprehensive metabolic panel    Senile osteoporosis  Continue  alendronate, refill provided.  She has been taking this for 2 years now, will plan to complete a full 5-year course.  - DEXA HIP/PELVIS/SPINE - Future; Future  - Vitamin D Deficiency; Future  - Vitamin D Deficiency              Counseling  Appropriate preventive services were discussed with this patient, including applicable screening as appropriate for fall prevention, nutrition, physical activity, Tobacco-use cessation, weight loss and cognition.  Checklist reviewing preventive services available has been given to the patient.  Reviewed patient's diet, addressing concerns and/or questions.   She is at risk for lack of exercise and has been provided with information to increase physical activity for the benefit of her well-being.   The patient was instructed to see the dentist every 6 months.   The patient was provided with written information regarding signs of hearing loss.           Renata Turner is a 83 year old, presenting for the following:  Physical (AWV- Tripped over 2-puppie's on thanksgiving day need to look at the left foot  )        5/9/2024    12:30 PM   Additional Questions   Roomed by [facility] Directive  Patient does not have a Health Care Directive or Living Will: Discussed advance care planning with patient; information given to patient to review.    Seen in clinic today for routine annual wellness visit.  She tripped over 2 puppies in [place], has had a little bit of a stretching sensation with extreme ranges of motion of her left ankle but no difficulties with activity.  She otherwise has no concerns.  She spent 2 months in [place].  Atrial fibrillation is followed by cardiology Dr. Curiel, remains on metoprolol and Xarelto, tolerating well.  Denies edema, shortness of breath, chest pain.  Remains on alendronate and management of osteoporosis, has been taking this for 2 years now and tolerating well.  Due for follow-up of dyslipidemia and impaired fasting  glucose.          5/9/2024   General Health   How would you rate your overall physical health? Good   Feel stress (tense, anxious, or unable to sleep) To some extent   (!) STRESS CONCERN      5/9/2024   Nutrition   Diet: Regular (no restrictions)         5/9/2024   Exercise   Days per week of moderate/strenous exercise 3 days         5/9/2024   Social Factors   Frequency of gathering with friends or relatives More than three times a week   Worry food won't last until get money to buy more No   Food not last or not have enough money for food? No   Do you have housing?  Yes   Are you worried about losing your housing? No   Lack of transportation? No   Unable to get utilities (heat,electricity)? No         5/9/2024   Fall Risk   Fallen 2 or more times in the past year? No   Trouble with walking or balance? No          5/9/2024   Activities of Daily Living- Home Safety   Needs help with the following daily activites None of the above   Safety concerns in the home None of the above         5/9/2024   Dental   Dentist two times every year? (!) NO         5/9/2024   Hearing Screening   Hearing concerns? (!) I FEEL THAT PEOPLE ARE MUMBLING OR NOT SPEAKING CLEARLY.    (!) IT'S HARD TO FOLLOW A CONVERSATION IN A NOISY RESTAURANT OR CROWDED ROOM.         5/9/2024   Driving Risk Screening   Patient/family members have concerns about driving No         5/9/2024   General Alertness/Fatigue Screening   Have you been more tired than usual lately? No         5/9/2024   Urinary Incontinence Screening   Bothered by leaking urine in past 6 months No         5/9/2024   TB Screening   Were you born outside of the [place]? No         Today's PHQ-2 Score:       5/9/2024    12:30 PM   PHQ-2 ( 1999 [employer])   Q1: Little interest or pleasure in doing things 0   Q2: Feeling down, depressed or hopeless 0   PHQ-2 Score 0   Q1: Little interest or pleasure in doing things Not at all   Q2: Feeling down, depressed or hopeless Not at all   PHQ-2 Score 0            2024   Substance Use   Alcohol more than 3/day or more than 7/wk No   Do you have a current opioid prescription? No   How severe/bad is pain from 1 to 10? 0/10 (No Pain)   Do you use any other substances recreationally? No     Social History     Tobacco Use    Smoking status: Former    Smokeless tobacco: Never   Vaping Use    Vaping status: Never Used           2022   LAST FHS-7 RESULTS   1st degree relative breast or ovarian cancer No   Any relative bilateral breast cancer No   Any male have breast cancer No   Any ONE woman have BOTH breast AND ovarian cancer No   Any woman with breast cancer before 50yrs No   2 or more relatives with breast AND/OR ovarian cancer No   2 or more relatives with breast AND/OR bowel cancer No        Mammogram Screening - After age 74- determine frequency with patient based on health status, life expectancy and patient goals              Reviewed and updated as needed this visit by Provider                    Past Medical History:   Diagnosis Date    Inverted nipple      Past Surgical History:   Procedure Laterality Date    HC REMOVAL OF TONSILS,<11 Y/O      Description: Tonsillectomy;  Recorded: 2007;    ZC APPENDECTOMY      Description: Appendectomy;  Recorded: 2007;     OB History    Para Term  AB Living   4 4 4 0 0 0   SAB IAB Ectopic Multiple Live Births   0 0 0 0 0      # Outcome Date GA Lbr Rickey/2nd Weight Sex Type Anes PTL Lv   4 Term            3 Term            2 Term            1 Term              Lab work is in process  Labs reviewed in EPIC  BP Readings from Last 3 Encounters:   24 138/81   23 (!) 151/74   23 134/86    Wt Readings from Last 3 Encounters:   24 63.6 kg (140 lb 3.2 oz)   23 64 kg (141 lb)   23 64.1 kg (141 lb 6.4 oz)                  Patient Active Problem List   Diagnosis    Hyperlipidemia    Impaired Fasting Glucose    Anxiety    Postmenopausal Osteoporosis    Paroxysmal atrial  fibrillation (H)    Cardiomyopathy, unspecified type (H)     Past Surgical History:   Procedure Laterality Date    HC REMOVAL OF TONSILS,<11 Y/O      Description: Tonsillectomy;  Recorded: 12/18/2007;    C APPENDECTOMY      Description: Appendectomy;  Recorded: 12/18/2007;       Social History     Tobacco Use    Smoking status: Former    Smokeless tobacco: Never   Substance Use Topics    Alcohol use: Not on file     Family History   Problem Relation Age of Onset    Hypertension Father     Breast Cancer No family hx of          Current Outpatient Medications   Medication Sig Dispense Refill    alendronate (FOSAMAX) 70 MG tablet Take 1 tablet (70 mg) by mouth every 7 days 14 tablet 4    metoprolol succinate ER (TOPROL XL) 200 MG 24 hr tablet Take 1 tablet (200 mg) by mouth daily 90 tablet 3    multivitamin with minerals (THERA-M) 9 mg iron-400 mcg Tab tablet [MULTIVITAMIN WITH MINERALS (THERA-M) 9 MG IRON-400 MCG TAB TABLET] Take 1 tablet by mouth daily.      rivaroxaban ANTICOAGULANT (XARELTO ANTICOAGULANT) 20 MG TABS tablet Take 1 tablet (20 mg) by mouth daily (with dinner) 90 tablet 3     No Known Allergies  Recent Labs   Lab Test 05/09/24  1333 05/08/23  1131 05/05/22  1135 04/07/21  1050   A1C 4.9 5.0 5.1 4.9   LDL  --  105* 122 112   HDL  --  75 71 64   TRIG  --  82 67 76   ALT  --  11  --   --    CR  --  0.91 0.79 0.83   GFRESTIMATED  --  63 76 >60   GFRESTBLACK  --   --   --  >60   POTASSIUM  --  4.5 4.2 4.3      Current providers sharing in care for this patient include:  Patient Care Team:  Celeste Jackson MD as PCP - General  Celeste Jackson MD as Assigned PCP  Anirudh Curiel MD as Assigned Heart and Vascular Provider    The following health maintenance items are reviewed in Epic and correct as of today:  Health Maintenance   Topic Date Due    RSV VACCINE (Pregnancy & 60+) (1 - 1-dose 60+ series) Never done    DEXA  06/28/2023    COVID-19 Vaccine (7 - 2023-24 season) 03/04/2024    LIPID   "05/08/2024    ANNUAL REVIEW OF HM ORDERS  05/08/2024    MEDICARE ANNUAL WELLNESS VISIT  05/08/2024    FALL RISK ASSESSMENT  05/09/2025    DTAP/TDAP/TD IMMUNIZATION (2 - Td or Tdap) 05/22/2025    ADVANCE CARE PLANNING  05/08/2028    PHQ-2 (once per calendar year)  Completed    INFLUENZA VACCINE  Completed    Pneumococcal Vaccine: 65+ Years  Completed    ZOSTER IMMUNIZATION  Completed    IPV IMMUNIZATION  Aged Out    HPV IMMUNIZATION  Aged Out    MENINGITIS IMMUNIZATION  Aged Out    RSV MONOCLONAL ANTIBODY  Aged Out         Review of Systems  Constitutional, neuro, ENT, endocrine, pulmonary, cardiac, gastrointestinal, genitourinary, musculoskeletal, integument and psychiatric systems are negative, except as otherwise noted.     Objective    Exam  BP (!) 143/90 (BP Location: Left arm, Patient Position: Sitting, Cuff Size: Adult Regular)   Pulse 96   Temp 97.8  F (36.6  C) (Temporal)   Resp 10   Ht 1.699 m (5' 6.89\")   Wt 63.6 kg (140 lb 3.2 oz)   SpO2 96%   BMI 22.03 kg/m     Estimated body mass index is 22.03 kg/m  as calculated from the following:    Height as of this encounter: 1.699 m (5' 6.89\").    Weight as of this encounter: 63.6 kg (140 lb 3.2 oz).    Physical Exam  Physical Examination: General appearance - alert, well appearing, and in no distress, oriented to person, place, and time and normal appearing weight  Mental status - alert, oriented to person, place, and time, normal mood, behavior, speech, dress, motor activity, and thought processes  Eyes - pupils equal and reactive, extraocular eye movements intact  Ears - bilateral TM's and external ear canals normal  Nose - normal and patent, no erythema, discharge or polyps  Mouth - mucous membranes moist, pharynx normal without lesions  Neck - supple, no significant adenopathy  Lymphatics - no palpable lymphadenopathy, no hepatosplenomegaly  Chest - clear to auscultation, no wheezes, rales or rhonchi, symmetric air entry  Heart - normal rate, regular " rhythm, normal S1, S2, no murmurs, rubs, clicks or gallops  Abdomen - soft, nontender, nondistended, no masses or organomegaly  Breasts - breasts appear normal, no suspicious masses, no skin or nipple changes or axillary nodes  Neurological - alert, oriented, normal speech, no focal findings or movement disorder noted  Musculoskeletal - no joint tenderness, deformity or swelling good range of motion of left ankle without tenderness or swelling.  Extremities - peripheral pulses normal, no pedal edema, no clubbing or cyanosis  Skin - normal coloration and turgor, no rashes, no suspicious skin lesions noted          5/9/2024   Mini Cog   Clock Draw Score 2 Normal   3 Item Recall 3 objects recalled   Mini Cog Total Score 5              Signed Electronically by: Celeste Jackson MD

## 2024-05-09 NOTE — PATIENT INSTRUCTIONS
"Preventive Care Advice   This is general advice we often give to help people stay healthy. Your care team may have specific advice just for you. Please talk to your care team about your own preventive care needs.  Lifestyle  Exercise at least 150 minutes each week (30 minutes a day, 5 days a week).  Do muscle strengthening activities 2 days a week. These help control your weight and prevent disease.  No smoking.  Wear sunscreen to prevent skin cancer.  Have your home tested for radon every 2 to 5 years. Radon is a colorless, odorless gas that can harm your lungs. To learn more, go to www.health.[facility].[place]. and search for \"Radon in Homes.\"  Keep guns unloaded and locked up in a safe place like a safe or gun vault, or, use a gun lock and hide the keys. Always lock away bullets separately. To learn more, visit "[employer].[place].gov and search for \"safe gun storage.\"  Nutrition  Eat 5 or more servings of fruits and vegetables each day.  Try wheat bread, brown rice and whole grain pasta (instead of white bread, rice, and pasta).  Get enough calcium and vitamin D. Check the label on foods and aim for 100% of the RDA (recommended daily allowance).  Regular exams  Have a dental exam and cleaning every 6 months.  See your health care team every year to talk about:  Any changes in your health.  Any medicines your care team has prescribed.  Preventive care, family planning, and ways to prevent chronic diseases.  Shots (vaccines)   HPV shots (up to age 26), if you've never had them before.  Hepatitis B shots (up to age 59), if you've never had them before.  COVID-19 shot: Get this shot when it's due.  Flu shot: Get a flu shot every year.  Tetanus shot: Get a tetanus shot every 10 years.  Pneumococcal, hepatitis A, and RSV shots: Ask your care team if you need these based on your risk.  Shingles shot (for age 50 and up).  General health tests  Diabetes screening:  Starting at age 35, Get screened for diabetes at least every 3 years.  If " you are younger than age 35, ask your care team if you should be screened for diabetes.  Cholesterol test: At age 39, start having a cholesterol test every 5 years, or more often if advised.  Bone density scan (DEXA): At age 50, ask your care team if you should have this scan for osteoporosis (brittle bones).  Hepatitis C: Get tested at least once in your life.  Abdominal aortic aneurysm screening: Talk to your doctor about having this screening if you:  Have ever smoked; and  Are biologically male; and  Are between the ages of 65 and 75.  STIs (sexually transmitted infections)  Before age 24: Ask your care team if you should be screened for STIs.  After age 24: Get screened for STIs if you're at risk. You are at risk for STIs (including HIV) if:  You are sexually active with more than one person.  You don't use condoms every time.  You or a partner was diagnosed with a sexually transmitted infection.  If you are at risk for HIV, ask about PrEP medicine to prevent HIV.  Get tested for HIV at least once in your life, whether you are at risk for HIV or not.  Cancer screening tests  Cervical cancer screening: If you have a cervix, begin getting regular cervical cancer screening tests at age 21. Most people who have regular screenings with normal results can stop after age 65. Talk about this with your provider.  Breast cancer scan (mammogram): If you've ever had breasts, begin having regular mammograms starting at age 40. This is a scan to check for breast cancer.  Colon cancer screening: It is important to start screening for colon cancer at age 45.  Have a colonoscopy test every 10 years (or more often if you're at risk) Or, ask your provider about stool tests like a FIT test every year or Cologuard test every 3 years.  To learn more about your testing options, visit: www.[employer]/481931.pdf.  For help making a decision, visit: [email or web address].  Prostate cancer screening test: If you have a prostate and are age 55  to 69, ask your provider if you would benefit from a yearly prostate cancer screening test.  Lung cancer screening: If you are a current or former smoker age 50 to 80, ask your care team if ongoing lung cancer screenings are right for you.  For informational purposes only. Not to replace the advice of your health care provider. Copyright   2023 [employer]. All rights reserved. Clinically reviewed by the [facility] Program. Cirqle 549717 - REV 04/24.    Hearing Loss: Care Instructions  Overview     Hearing loss is a sudden or slow decrease in how well you hear. It can range from slight to profound. Permanent hearing loss can occur with aging. It also can happen when you are exposed long-term to loud noise. Examples include listening to loud music, riding motorcycles, or being around other loud machines.  Hearing loss can affect your work and home life. It can make you feel lonely or depressed. You may feel that you have lost your independence. But hearing aids and other devices can help you hear better and feel connected to others.  Follow-up care is a key part of your treatment and safety. Be sure to make and go to all appointments, and call your doctor if you are having problems. It's also a good idea to know your test results and keep a list of the medicines you take.  How can you care for yourself at home?  Avoid loud noises whenever possible. This helps keep your hearing from getting worse.  Always wear hearing protection around loud noises.  Wear a hearing aid as directed.  A professional can help you pick a hearing aid that will work best for you.  You can also get hearing aids over the counter for mild to moderate hearing loss.  Have hearing tests as your doctor suggests. They can show whether your hearing has changed. Your hearing aid may need to be adjusted.  Use other devices as needed. These may include:  Telephone amplifiers and hearing aids that can connect to a  "television, stereo, radio, or microphone.  Devices that use lights or vibrations. These alert you to the doorbell, a ringing telephone, or a baby monitor.  Television closed-captioning. This shows the words at the bottom of the screen. Most new TVs can do this.  TTY (text telephone). This lets you type messages back and forth on the telephone instead of talking or listening. These devices are also called TDD. When messages are typed on the keyboard, they are sent over the phone line to a receiving TTY. The message is shown on a monitor.  Use text messaging, social media, and email if it is hard for you to communicate by telephone.  Try to learn a listening technique called speechreading. It is not lipreading. You pay attention to people's gestures, expressions, posture, and tone of voice. These clues can help you understand what a person is saying. Face the person you are talking to, and have them face you. Make sure the lighting is good. You need to see the other person's face clearly.  Think about counseling if you need help to adjust to your hearing loss.  When should you call for help?  Watch closely for changes in your health, and be sure to contact your doctor if:    You think your hearing is getting worse.     You have new symptoms, such as dizziness or nausea.   Where can you learn more?  Go to https://www.[employer].net/patiented  Enter R798 in the search box to learn more about \"Hearing Loss: Care Instructions.\"  Current as of: September 27, 2023               Content Version: 14.0    3331-3457 [employer].   Care instructions adapted under license by your healthcare professional. If you have questions about a medical condition or this instruction, always ask your healthcare professional. Health[employer], [employer] disclaims any warranty or liability for your use of this information.      Learning About Stress  What is stress?     Stress is your body's response to a hard situation. Your body can " have a physical, emotional, or mental response. Stress is a fact of life for most people, and it affects everyone differently. What causes stress for you may not be stressful for someone else.  A lot of things can cause stress. You may feel stress when you go on a job interview, take a test, or run a race. This kind of short-term stress is normal and even useful. It can help you if you need to work hard or react quickly. For example, stress can help you finish an important job on time.  Long-term stress is caused by ongoing stressful situations or events. Examples of long-term stress include long-term health problems, ongoing problems at work, or conflicts in your family. Long-term stress can harm your health.  How does stress affect your health?  When you are stressed, your body responds as though you are in danger. It makes hormones that speed up your heart, make you breathe faster, and give you a burst of energy. This is called the fight-or-flight stress response. If the stress is over quickly, your body goes back to normal and no harm is done.  But if stress happens too often or lasts too long, it can have bad effects. Long-term stress can make you more likely to get sick, and it can make symptoms of some diseases worse. If you tense up when you are stressed, you may develop neck, shoulder, or low back pain. Stress is linked to high blood pressure and heart disease.  Stress also harms your emotional health. It can make you fernandes, tense, or depressed. Your relationships may suffer, and you may not do well at work or school.  What can you do to manage stress?  You can try these things to help manage stress:   Do something active. Exercise or activity can help reduce stress. Walking is a great way to get started. Even everyday activities such as housecleaning or yard work can help.  Try yoga or pina chi. These techniques combine exercise and meditation. You may need some training at first to learn them.  Do  "something you enjoy. For example, listen to music or go to a movie. Practice your hobby or do volunteer work.  Meditate. This can help you relax, because you are not worrying about what happened before or what may happen in the future.  Do guided imagery. Imagine yourself in any setting that helps you feel calm. You can use online videos, books, or a teacher to guide you.  Do breathing exercises. For example:  From a standing position, bend forward from the waist with your knees slightly bent. Let your arms dangle close to the floor.  Breathe in slowly and deeply as you return to a standing position. Roll up slowly and lift your head last.  Hold your breath for just a few seconds in the standing position.  Breathe out slowly and bend forward from the waist.  Let your feelings out. Talk, laugh, cry, and express anger when you need to. Talking with supportive friends or family, a counselor, or a nilda leader about your feelings is a healthy way to relieve stress. Avoid discussing your feelings with people who make you feel worse.  Write. It may help to write about things that are bothering you. This helps you find out how much stress you feel and what is causing it. When you know this, you can find better ways to cope.  What can you do to prevent stress?  You might try some of these things to help prevent stress:  Manage your time. This helps you find time to do the things you want and need to do.  Get enough sleep. Your body recovers from the stresses of the day while you are sleeping.  Get support. Your family, friends, and community can make a difference in how you experience stress.  Limit your news feed. Avoid or limit time on social media or news that may make you feel stressed.  Do something active. Exercise or activity can help reduce stress. Walking is a great way to get started.  Where can you learn more?  Go to https://www.healthwise.net/patiented  Enter N032 in the search box to learn more about \"Learning " "About Stress.\"  Current as of: October 24, 2023               Content Version: 14.0    0820-7475 [employer].   Care instructions adapted under license by your healthcare professional. If you have questions about a medical condition or this instruction, always ask your healthcare professional. [employer] disclaims any warranty or liability for your use of this information.      "

## 2024-05-10 LAB
ALBUMIN SERPL BCG-MCNC: 4.6 G/DL (ref 3.5–5.2)
ALP SERPL-CCNC: 53 U/L (ref 40–150)
ALT SERPL W P-5'-P-CCNC: 12 U/L (ref 0–50)
ANION GAP SERPL CALCULATED.3IONS-SCNC: 13 MMOL/L (ref 7–15)
AST SERPL W P-5'-P-CCNC: 23 U/L (ref 0–45)
BILIRUB SERPL-MCNC: 1.3 MG/DL
BUN SERPL-MCNC: 17.2 MG/DL (ref 8–23)
CALCIUM SERPL-MCNC: 10 MG/DL (ref 8.8–10.2)
CHLORIDE SERPL-SCNC: 101 MMOL/L (ref 98–107)
CHOLEST SERPL-MCNC: 206 MG/DL
CREAT SERPL-MCNC: 0.9 MG/DL (ref 0.51–0.95)
DEPRECATED HCO3 PLAS-SCNC: 25 MMOL/L (ref 22–29)
EGFRCR SERPLBLD CKD-EPI 2021: 64 ML/MIN/1.73M2
FASTING STATUS PATIENT QL REPORTED: NO
FASTING STATUS PATIENT QL REPORTED: NO
GLUCOSE SERPL-MCNC: 122 MG/DL (ref 70–99)
HDLC SERPL-MCNC: 78 MG/DL
LDLC SERPL CALC-MCNC: 113 MG/DL
NONHDLC SERPL-MCNC: 128 MG/DL
POTASSIUM SERPL-SCNC: 4.6 MMOL/L (ref 3.4–5.3)
PROT SERPL-MCNC: 7.3 G/DL (ref 6.4–8.3)
SODIUM SERPL-SCNC: 139 MMOL/L (ref 135–145)
TRIGL SERPL-MCNC: 75 MG/DL
VIT D+METAB SERPL-MCNC: 61 NG/ML (ref 20–50)

## 2024-05-10 RX ORDER — ALENDRONATE SODIUM 70 MG/1
70 TABLET ORAL
Qty: 14 TABLET | Refills: 4 | Status: SHIPPED | OUTPATIENT
Start: 2024-05-10

## 2024-05-21 ENCOUNTER — TELEPHONE (OUTPATIENT)
Dept: ANTICOAGULATION | Facility: CLINIC | Age: 84
End: 2024-05-21
Payer: COMMERCIAL

## 2024-05-21 DIAGNOSIS — I48.0 PAROXYSMAL ATRIAL FIBRILLATION (H): ICD-10-CM

## 2024-05-21 NOTE — TELEPHONE ENCOUNTER
ANTICOAGULATION DIRECT ORAL ANTICOAGULANT MONITORING    SUBJECTIVE     [facility] is evaluating Hector Gill's Rivaroxaban (Xarelto) as part of its Anticoagulation Monitoring Program.    Indication:Atrial Fibrillation  Current dose per medication list: Rivaroxaban 20 mg Daily  Recent hospitalizations/ED/Office Visits for bleeding/clotting concerns: No  Other bleeding or side effect concerns: No  Additional findings: None    OBJECTIVE     Age: 83 year old    Wt Readings from Last 2 Encounters:   05/09/24 63.6 kg (140 lb 3.2 oz)   05/09/23 64 kg (141 lb)      Lab Results   Component Value Date    CR 0.90 05/09/2024    CR 0.91 05/08/2023    CR 0.79 05/05/2022     Creatinine Clearance (using actual bodyweight, mL/min): 49    Lab Results   Component Value Date    HGB 15.0 05/08/2023     05/08/2023     ASSESSMENT/PLAN     A chart review for Direct Oral Anticoagulant (DOAC) Stewardship has been completed for:     Dosing: recommend adjustment to Rivaroxaban 15 mg Daily for CrCl <= 50 mL/min (using actual bodyweight) (consistent with package insert dosing)    Plan made per [facility] anticoagulation protocol    Jazzy Morris RN  Anticoagulation Clinic

## 2024-05-29 NOTE — TELEPHONE ENCOUNTER
ANTICOAGULATION  STEWARDSHIP    Spoke with Yue to review advised dosage change for Rivaroxaban (Xarelto).    Education provided: how to take rivaroxaban (Xarelto) safety: take dose with evening meal (or largest meal of day); at the same time each day, may finish the supply of 20 mg tablets that you have at home before starting the new dose. , not discontinue therapy without talking to their provider first, and notifying provider of surgeries or procedures 1-2 weeks in advance    They verbalized understanding of new Rivaroxaban (Xarelto) dose/tablet strength  They were notified Rx for new dosage would be sent to preferred pharmacy    Jazzy Morris RN  [facility]

## 2024-07-01 ENCOUNTER — OFFICE VISIT (OUTPATIENT)
Dept: CARDIOLOGY | Facility: CLINIC | Age: 84
End: 2024-07-01
Payer: COMMERCIAL

## 2024-07-01 VITALS
WEIGHT: 142.3 LBS | RESPIRATION RATE: 12 BRPM | HEIGHT: 68 IN | BODY MASS INDEX: 21.57 KG/M2 | HEART RATE: 64 BPM | SYSTOLIC BLOOD PRESSURE: 122 MMHG | DIASTOLIC BLOOD PRESSURE: 80 MMHG

## 2024-07-01 DIAGNOSIS — I48.0 PAROXYSMAL ATRIAL FIBRILLATION (H): ICD-10-CM

## 2024-07-01 DIAGNOSIS — I48.21 PERMANENT ATRIAL FIBRILLATION (H): ICD-10-CM

## 2024-07-01 PROCEDURE — 99214 OFFICE O/P EST MOD 30 MIN: CPT | Performed by: INTERNAL MEDICINE

## 2024-07-01 RX ORDER — METOPROLOL SUCCINATE 200 MG/1
200 TABLET, EXTENDED RELEASE ORAL DAILY
Qty: 90 TABLET | Refills: 3 | Status: SHIPPED | OUTPATIENT
Start: 2024-07-01

## 2024-07-01 NOTE — PROGRESS NOTES
[facility] [address], [place] [address]   [email or web address]   OFFICE: [phone]            Impression and Plan     1. Atrial fibrillation (permanent).  Yue has a history of permanent atrial fibrillation. As noted below, atrial fibrillation was initially diagnosed approximately 10-15 years ago when she had presented for a colonoscopy.      Over the last decade, she has had intermittent palpitations consistent with atrial fibrillation.  Typically, however, her atrial fibrillation has been incidentally picked up on examinations.  She is for the most part completely asymptomatic with the rhythm disturbance.      Holter monitor 7 June 2021 revealed well-controlled ventricular response. Plan:  Continue rivaroxaban (Xarelto ) 15 mg daily for CVA prophylaxis.   Continue metoprolol succinate 200 mg daily.  Will obtain 24  Holter monitor to ensure ventricular response is remaining well-controlled.     2.    Cardiomyopathy.  Echocardiogram 25 May 2021 suggested mild-moderate reduction in left ventricular systolic performance with ejection fraction of 40-45%. Echocardiogram 8 June 2023 revealed normalization of left ventricular systolic performance with ejection fraction of 60-65%.     3.  Aortic insufficiency.  This was felt mild in degree and most recent echocardiogram 8 June 2023.     4.  Hypertension.  Blood pressure is reasonable in the office today at 122/80 mmHg.     Tentatively plan on follow-up in 1 year.        35 minutes spent reviewing prior records (including documentation, laboratory studies, cardiac testing/imaging), interview with patient along with physical exam, planning, and subsequent documentation/crafting of note).           History of Present Illness    Once again I would like to thank you again for asking me to participate in the care of your patient, Hector Gill.  As you know, but to reiterate for my own records, Hector CUMMINGS  Bridget is a 83 year old female with permanent atrial fibrillation. She specifically denies any shortness of breath, chest pain, or subjective decline in her exercise tolerance. She denies any lightheadedness.  She reports no fevers, chills, or other constitutional symptoms.  Overall, she is pleased with how she is performing from a cardiac standpoint.    Further review of systems is otherwise negative/noncontributory (medical record and 13 point review of systems reviewed as well and pertinent positives noted).         Cardiac Diagnostics      Echocardiogram 8 June 2023:  Normal left ventricular size and systolic performance with ejection fraction of 60-65%.  Mild aortic insufficiency.  Normal right ventricular size and systolic performance.  Severe left atrial enlargement.  Moderate-severe right atrial enlargement.    Echocardiogram 25 May 2021:  Normal left ventricular size with mild-moderate reduction left ventricular systolic performance.  Ejection fraction 40-45%.  Mild aortic insufficiency.  Normal right ventricular size and systolic performance.  Moderate biatrial enlargement.    Echocardiogram 17 June 2016 (personally reviewed):  Normal left ventricular size and systolic performance with ejection fraction of 55%.  Mild aortic insufficiency.    Holter monitor 7 June 2021:  Persistent atrial fibrillation with controlled ventricular response.    Right bundle branch block noted.    Holter monitor 24 September 2018:   Abnormal Holter monitor tracing by virtue the persistent atrial fibrillation demonstrated throughout the recording.  Regular response appears to be well controlled in atrial fibrillation.  The patient reported no symptoms.  No significant ventricular arrhythmia  No profound bradycardia or pauses.     12-lead ECG (personally reviewed) 5/24/2021: Atrial fibrillation with heart rate of 110s bpm.  Right bundle branch block.     12-lead ECG (personally reviewed) 10 September 2018: Atrial fibrillation  "with heart rate of 112 bpm.  Right bundle branch block.     12-lead ECG (personally reviewed) 26 May 2016: Sinus rhythm with occasional PACs. Incomplete right bundle branch block. Mild nonspecific T-wave changes.     12-lead ECG (personally reviewed) 24 May 2016: Atrial fibrillation/flutter. Right bundle branch block.     12-lead ECG (personally reviewed) 27 October 2005: Normal sinus rhythm. PACs. Right bundle branch block.         Physical Examination       /80 (BP Location: Left arm, Patient Position: Sitting, Cuff Size: Adult Regular)   Pulse 64   Resp 12   Ht 1.727 m (5' 8\")   Wt 64.5 kg (142 lb 4.8 oz)   BMI 21.64 kg/m          Wt Readings from Last 3 Encounters:   07/01/24 64.5 kg (142 lb 4.8 oz)   05/09/24 63.6 kg (140 lb 3.2 oz)   05/09/23 64 kg (141 lb)       The patient is alert and oriented times three. Sclerae are anicteric. Mucosal membranes are moist. Jugular venous pressure is normal. No significant adenopathy/thyromegally appreciated. Lungs are clear with good expansion. On cardiovascular exam, the patient has an irregular S1 and S2. Abdomen is soft and non-tender. Extremities reveal no clubbing, cyanosis, or edema.         Family History/Social History/Risk Factors   Patient does not smoke.  Family history reviewed, and family history includes Hypertension in her father. 3         Medical History  Surgical History Family History Social History   Past Medical History:   Diagnosis Date    Inverted nipple      Past Surgical History:   Procedure Laterality Date    HC REMOVAL OF TONSILS,<13 Y/O      Description: Tonsillectomy;  Recorded: 12/18/2007;    [facility] APPENDECTOMY      Description: Appendectomy;  Recorded: 12/18/2007;     Family History   Problem Relation Age of Onset    Hypertension Father     Breast Cancer No family hx of         Social History     Socioeconomic History    Marital status:      Spouse name: Not on file    Number of children: Not on file    Years of education: Not " on file    Highest education level: Not on file   Occupational History    Not on file   Tobacco Use    Smoking status: Former    Smokeless tobacco: Never   Vaping Use    Vaping status: Never Used   Substance and Sexual Activity    Alcohol use: Not on file    Drug use: Not on file    Sexual activity: Not on file   Other Topics Concern    Not on file   Social History Narrative    Not on file     Social Determinants of Health     Financial Resource Strain: Low Risk  (5/9/2024)    Financial Resource Strain     Within the past 12 months, have you or your family members you live with been unable to get utilities (heat, electricity) when it was really needed?: No   Food Insecurity: Low Risk  (5/9/2024)    Food Insecurity     Within the past 12 months, did you worry that your food would run out before you got money to buy more?: No     Within the past 12 months, did the food you bought just not last and you didn t have money to get more?: No   Transportation Needs: Low Risk  (5/9/2024)    Transportation Needs     Within the past 12 months, has lack of transportation kept you from medical appointments, getting your medicines, non-medical meetings or appointments, work, or from getting things that you need?: No   Physical Activity: Unknown (5/9/2024)    Exercise Vital Sign     Days of Exercise per Week: 3 days     Minutes of Exercise per Session: Not on file   Stress: Stress Concern Present (5/9/2024)    [employer] [place] [employer] - Occupational Stress Questionnaire     Feeling of Stress : To some extent   Social Connections: Unknown (5/9/2024)    Social Connection and Isolation Panel [NHANES]     Frequency of Communication with Friends and Family: Not on file     Frequency of Social Gatherings with Friends and Family: More than three times a week     Attends Quaker Services: Not on file     Active Member of Clubs or Organizations: Not on file     Attends Club or Organization Meetings: Not on file     Marital  Status: Not on file   Interpersonal Safety: Low Risk  (5/9/2024)    Interpersonal Safety     Do you feel physically and emotionally safe where you currently live?: Yes     Within the past 12 months, have you been hit, slapped, kicked or otherwise physically hurt by someone?: No     Within the past 12 months, have you been humiliated or emotionally abused in other ways by your partner or ex-partner?: No   Housing Stability: Low Risk  (5/9/2024)    Housing Stability     Do you have housing? : Yes     Are you worried about losing your housing?: No           Medications  Allergies   Current Outpatient Medications   Medication Sig Dispense Refill    alendronate (FOSAMAX) 70 MG tablet Take 1 tablet (70 mg) by mouth every 7 days 14 tablet 4    metoprolol succinate ER (TOPROL XL) 200 MG 24 hr tablet Take 1 tablet (200 mg) by mouth daily 90 tablet 3    multivitamin with minerals (THERA-M) 9 mg iron-400 mcg Tab tablet [MULTIVITAMIN WITH MINERALS (THERA-M) 9 MG IRON-400 MCG TAB TABLET] Take 1 tablet by mouth daily.      rivaroxaban ANTICOAGULANT (XARELTO ANTICOAGULANT) 15 MG TABS tablet Take 1 tablet (15 mg) by mouth daily (with dinner) 90 tablet 1     No Known Allergies       Lab Results    Chemistry/lipid CBC Cardiac Enzymes/BNP/TSH/INR   Recent Labs   Lab Test 05/09/24  1333   CHOL 206*   HDL 78   *   TRIG 75     Recent Labs   Lab Test 05/09/24  1333 05/08/23  1131 05/05/22  1135   * 105* 122     Recent Labs   Lab Test 05/09/24  1333      POTASSIUM 4.6   CHLORIDE 101   CO2 25   *   BUN 17.2   CR 0.90   GFRESTIMATED 64   DOMENIC 10.0     Recent Labs   Lab Test 05/09/24  1333 05/08/23  1131 05/05/22  1135   CR 0.90 0.91 0.79     Recent Labs   Lab Test 05/09/24  1333 05/08/23  1131 05/05/22  1135   A1C 4.9 5.0 5.1          Recent Labs   Lab Test 05/08/23  1131   WBC 5.9   HGB 15.0   HCT 43.8   MCV 91        Recent Labs   Lab Test 05/08/23  1131 04/07/21  1050 05/17/18  0938   HGB 15.0 14.7 14.3     "No results for input(s): \"TROPONINI\" in the last 55544 hours.  No results for input(s): \"BNP\", \"NTBNPI\", \"NTBNP\" in the last 86498 hours.  No results for input(s): \"TSH\" in the last 65690 hours.  No results for input(s): \"INR\" in the last 82422 hours.       Medications  Allergies   Current Outpatient Medications   Medication Sig Dispense Refill    alendronate (FOSAMAX) 70 MG tablet Take 1 tablet (70 mg) by mouth every 7 days 14 tablet 4    metoprolol succinate ER (TOPROL XL) 200 MG 24 hr tablet Take 1 tablet (200 mg) by mouth daily 90 tablet 3    multivitamin with minerals (THERA-M) 9 mg iron-400 mcg Tab tablet [MULTIVITAMIN WITH MINERALS (THERA-M) 9 MG IRON-400 MCG TAB TABLET] Take 1 tablet by mouth daily.      rivaroxaban ANTICOAGULANT (XARELTO ANTICOAGULANT) 15 MG TABS tablet Take 1 tablet (15 mg) by mouth daily (with dinner) 90 tablet 1      No Known Allergies       Lab Results   Lab Results   Component Value Date     05/09/2024    CO2 25 05/09/2024    CO2 24 05/05/2022    BUN 17.2 05/09/2024    BUN 18 05/05/2022     Lab Results   Component Value Date    WBC 5.9 05/08/2023    HGB 15.0 05/08/2023    HCT 43.8 05/08/2023    MCV 91 05/08/2023     05/08/2023     Lab Results   Component Value Date    CHOL 206 05/09/2024    TRIG 75 05/09/2024    HDL 78 05/09/2024     No results found for: \"INR\"  No results found for: \"BNP\"  No results found for: \"CKTOTAL\", \"CKMB\", \"TROPONINI\"  No results found for: \"TSH\"               "

## 2024-07-01 NOTE — LETTER
7/1/2024    Celeste Jackson MD  [address]  [facility] [address]    RE: Hector Gill       Dear Colleague,     I had the pleasure of seeing Hector CUMMINGS Bridget in the [facility].         [facility]   [address], [place] [address]   [email or web address]   OFFICE: [phone]            Impression and Plan     1. Atrial fibrillation (permanent).  Yue has a history of permanent atrial fibrillation. As noted below, atrial fibrillation was initially diagnosed approximately 10-15 years ago when she had presented for a colonoscopy.      Over the last decade, she has had intermittent palpitations consistent with atrial fibrillation.  Typically, however, her atrial fibrillation has been incidentally picked up on examinations.  She is for the most part completely asymptomatic with the rhythm disturbance.      Holter monitor 7 June 2021 revealed well-controlled ventricular response. Plan:  Continue rivaroxaban (Xarelto ) 15 mg daily for CVA prophylaxis.   Continue metoprolol succinate 200 mg daily.  Will obtain 24  Holter monitor to ensure ventricular response is remaining well-controlled.     2.    Cardiomyopathy.  Echocardiogram 25 May 2021 suggested mild-moderate reduction in left ventricular systolic performance with ejection fraction of 40-45%. Echocardiogram 8 June 2023 revealed normalization of left ventricular systolic performance with ejection fraction of 60-65%.     3.  Aortic insufficiency.  This was felt mild in degree and most recent echocardiogram 8 June 2023.     4.  Hypertension.  Blood pressure is reasonable in the office today at 122/80 mmHg.     Tentatively plan on follow-up in 1 year.        35 minutes spent reviewing prior records (including documentation, laboratory studies, cardiac testing/imaging), interview with patient along with physical exam, planning, and subsequent documentation/crafting of note).            History of Present Illness    Once again I would like to thank you again for asking me to participate in the care of your patient, Hector Gill.  As you know, but to reiterate for my own records, Hector Gill is a 83 year old female with permanent atrial fibrillation. She specifically denies any shortness of breath, chest pain, or subjective decline in her exercise tolerance. She denies any lightheadedness.  She reports no fevers, chills, or other constitutional symptoms.  Overall, she is pleased with how she is performing from a cardiac standpoint.    Further review of systems is otherwise negative/noncontributory (medical record and 13 point review of systems reviewed as well and pertinent positives noted).         Cardiac Diagnostics      Echocardiogram 8 June 2023:  Normal left ventricular size and systolic performance with ejection fraction of 60-65%.  Mild aortic insufficiency.  Normal right ventricular size and systolic performance.  Severe left atrial enlargement.  Moderate-severe right atrial enlargement.    Echocardiogram 25 May 2021:  Normal left ventricular size with mild-moderate reduction left ventricular systolic performance.  Ejection fraction 40-45%.  Mild aortic insufficiency.  Normal right ventricular size and systolic performance.  Moderate biatrial enlargement.    Echocardiogram 17 June 2016 (personally reviewed):  Normal left ventricular size and systolic performance with ejection fraction of 55%.  Mild aortic insufficiency.    Holter monitor 7 June 2021:  Persistent atrial fibrillation with controlled ventricular response.    Right bundle branch block noted.    Holter monitor 24 September 2018:   Abnormal Holter monitor tracing by virtue the persistent atrial fibrillation demonstrated throughout the recording.  Regular response appears to be well controlled in atrial fibrillation.  The patient reported no symptoms.  No significant ventricular arrhythmia  No profound  "bradycardia or pauses.     12-lead ECG (personally reviewed) 5/24/2021: Atrial fibrillation with heart rate of 110s bpm.  Right bundle branch block.     12-lead ECG (personally reviewed) 10 September 2018: Atrial fibrillation with heart rate of 112 bpm.  Right bundle branch block.     12-lead ECG (personally reviewed) 26 May 2016: Sinus rhythm with occasional PACs. Incomplete right bundle branch block. Mild nonspecific T-wave changes.     12-lead ECG (personally reviewed) 24 May 2016: Atrial fibrillation/flutter. Right bundle branch block.     12-lead ECG (personally reviewed) 27 October 2005: Normal sinus rhythm. PACs. Right bundle branch block.         Physical Examination       /80 (BP Location: Left arm, Patient Position: Sitting, Cuff Size: Adult Regular)   Pulse 64   Resp 12   Ht 1.727 m (5' 8\")   Wt 64.5 kg (142 lb 4.8 oz)   BMI 21.64 kg/m          Wt Readings from Last 3 Encounters:   07/01/24 64.5 kg (142 lb 4.8 oz)   05/09/24 63.6 kg (140 lb 3.2 oz)   05/09/23 64 kg (141 lb)       The patient is alert and oriented times three. Sclerae are anicteric. Mucosal membranes are moist. Jugular venous pressure is normal. No significant adenopathy/thyromegally appreciated. Lungs are clear with good expansion. On cardiovascular exam, the patient has an irregular S1 and S2. Abdomen is soft and non-tender. Extremities reveal no clubbing, cyanosis, or edema.         Family History/Social History/Risk Factors   Patient does not smoke.  Family history reviewed, and family history includes Hypertension in her father. 3         Medical History  Surgical History Family History Social History   Past Medical History:   Diagnosis Date    Inverted nipple      Past Surgical History:   Procedure Laterality Date    HC REMOVAL OF TONSILS,<13 Y/O      Description: Tonsillectomy;  Recorded: 12/18/2007;    [facility] APPENDECTOMY      Description: Appendectomy;  Recorded: 12/18/2007;     Family History   Problem Relation Age of " Onset    Hypertension Father     Breast Cancer No family hx of         Social History     Socioeconomic History    Marital status:      Spouse name: Not on file    Number of children: Not on file    Years of education: Not on file    Highest education level: Not on file   Occupational History    Not on file   Tobacco Use    Smoking status: Former    Smokeless tobacco: Never   Vaping Use    Vaping status: Never Used   Substance and Sexual Activity    Alcohol use: Not on file    Drug use: Not on file    Sexual activity: Not on file   Other Topics Concern    Not on file   Social History Narrative    Not on file     Social Determinants of Health     Financial Resource Strain: Low Risk  (5/9/2024)    Financial Resource Strain     Within the past 12 months, have you or your family members you live with been unable to get utilities (heat, electricity) when it was really needed?: No   Food Insecurity: Low Risk  (5/9/2024)    Food Insecurity     Within the past 12 months, did you worry that your food would run out before you got money to buy more?: No     Within the past 12 months, did the food you bought just not last and you didn t have money to get more?: No   Transportation Needs: Low Risk  (5/9/2024)    Transportation Needs     Within the past 12 months, has lack of transportation kept you from medical appointments, getting your medicines, non-medical meetings or appointments, work, or from getting things that you need?: No   Physical Activity: Unknown (5/9/2024)    Exercise Vital Sign     Days of Exercise per Week: 3 days     Minutes of Exercise per Session: Not on file   Stress: Stress Concern Present (5/9/2024)    [employer] [place] [employer] - Occupational Stress Questionnaire     Feeling of Stress : To some extent   Social Connections: Unknown (5/9/2024)    Social Connection and Isolation Panel [NHANES]     Frequency of Communication with Friends and Family: Not on file     Frequency of Social  Gatherings with Friends and Family: More than three times a week     Attends Anabaptism Services: Not on file     Active Member of Clubs or Organizations: Not on file     Attends Club or Organization Meetings: Not on file     Marital Status: Not on file   Interpersonal Safety: Low Risk  (5/9/2024)    Interpersonal Safety     Do you feel physically and emotionally safe where you currently live?: Yes     Within the past 12 months, have you been hit, slapped, kicked or otherwise physically hurt by someone?: No     Within the past 12 months, have you been humiliated or emotionally abused in other ways by your partner or ex-partner?: No   Housing Stability: Low Risk  (5/9/2024)    Housing Stability     Do you have housing? : Yes     Are you worried about losing your housing?: No           Medications  Allergies   Current Outpatient Medications   Medication Sig Dispense Refill    alendronate (FOSAMAX) 70 MG tablet Take 1 tablet (70 mg) by mouth every 7 days 14 tablet 4    metoprolol succinate ER (TOPROL XL) 200 MG 24 hr tablet Take 1 tablet (200 mg) by mouth daily 90 tablet 3    multivitamin with minerals (THERA-M) 9 mg iron-400 mcg Tab tablet [MULTIVITAMIN WITH MINERALS (THERA-M) 9 MG IRON-400 MCG TAB TABLET] Take 1 tablet by mouth daily.      rivaroxaban ANTICOAGULANT (XARELTO ANTICOAGULANT) 15 MG TABS tablet Take 1 tablet (15 mg) by mouth daily (with dinner) 90 tablet 1     No Known Allergies       Lab Results    Chemistry/lipid CBC Cardiac Enzymes/BNP/TSH/INR   Recent Labs   Lab Test 05/09/24  1333   CHOL 206*   HDL 78   *   TRIG 75     Recent Labs   Lab Test 05/09/24  1333 05/08/23  1131 05/05/22  1135   * 105* 122     Recent Labs   Lab Test 05/09/24  1333      POTASSIUM 4.6   CHLORIDE 101   CO2 25   *   BUN 17.2   CR 0.90   GFRESTIMATED 64   DOMENIC 10.0     Recent Labs   Lab Test 05/09/24  1333 05/08/23  1131 05/05/22  1135   CR 0.90 0.91 0.79     Recent Labs   Lab Test 05/09/24  1333  "05/08/23  1131 05/05/22  1135   A1C 4.9 5.0 5.1          Recent Labs   Lab Test 05/08/23  1131   WBC 5.9   HGB 15.0   HCT 43.8   MCV 91        Recent Labs   Lab Test 05/08/23  1131 04/07/21  1050 05/17/18  0938   HGB 15.0 14.7 14.3    No results for input(s): \"TROPONINI\" in the last 31103 hours.  No results for input(s): \"BNP\", \"NTBNPI\", \"NTBNP\" in the last 13526 hours.  No results for input(s): \"TSH\" in the last 57111 hours.  No results for input(s): \"INR\" in the last 82877 hours.       Medications  Allergies   Current Outpatient Medications   Medication Sig Dispense Refill    alendronate (FOSAMAX) 70 MG tablet Take 1 tablet (70 mg) by mouth every 7 days 14 tablet 4    metoprolol succinate ER (TOPROL XL) 200 MG 24 hr tablet Take 1 tablet (200 mg) by mouth daily 90 tablet 3    multivitamin with minerals (THERA-M) 9 mg iron-400 mcg Tab tablet [MULTIVITAMIN WITH MINERALS (THERA-M) 9 MG IRON-400 MCG TAB TABLET] Take 1 tablet by mouth daily.      rivaroxaban ANTICOAGULANT (XARELTO ANTICOAGULANT) 15 MG TABS tablet Take 1 tablet (15 mg) by mouth daily (with dinner) 90 tablet 1      No Known Allergies       Lab Results   Lab Results   Component Value Date     05/09/2024    CO2 25 05/09/2024    CO2 24 05/05/2022    BUN 17.2 05/09/2024    BUN 18 05/05/2022     Lab Results   Component Value Date    WBC 5.9 05/08/2023    HGB 15.0 05/08/2023    HCT 43.8 05/08/2023    MCV 91 05/08/2023     05/08/2023     Lab Results   Component Value Date    CHOL 206 05/09/2024    TRIG 75 05/09/2024    HDL 78 05/09/2024     No results found for: \"INR\"  No results found for: \"BNP\"  No results found for: \"CKTOTAL\", \"CKMB\", \"TROPONINI\"  No results found for: \"TSH\"                   Thank you for allowing me to participate in the care of your patient.      Sincerely,     Anirudh Curiel MD     [facility]  cc:   Anirudh Curiel MD  [address] [place] [address] [id number]  [place] [address]

## 2024-07-08 ENCOUNTER — HOSPITAL ENCOUNTER (OUTPATIENT)
Dept: CARDIOLOGY | Facility: CLINIC | Age: 84
Discharge: HOME | End: 2024-07-08
Attending: INTERNAL MEDICINE | Admitting: INTERNAL MEDICINE
Payer: COMMERCIAL

## 2024-07-08 DIAGNOSIS — I48.21 PERMANENT ATRIAL FIBRILLATION (H): ICD-10-CM

## 2024-07-08 PROCEDURE — 93225 XTRNL ECG REC<48 HRS REC: CPT

## 2024-07-12 PROCEDURE — 93227 XTRNL ECG REC<48 HR R&I: CPT | Performed by: INTERNAL MEDICINE

## 2024-09-26 ENCOUNTER — NURSE TRIAGE (OUTPATIENT)
Dept: FAMILY MEDICINE | Facility: CLINIC | Age: 84
End: 2024-09-26
Payer: COMMERCIAL

## 2024-09-26 NOTE — TELEPHONE ENCOUNTER
[facility]    Nurse Triage SBAR    Is this a 2nd Level Triage? YES, LICENSED PRACTITIONER REVIEW IS REQUIRED    Situation: Pt states she has had blood in her urine off and on over the last week. Pt takes xarelto. Pt states her urine is sometimes red, sometimes brown, and sometimes normal urine color over the last week. Urine is clear. Pt denies muscle pain, fever, burning, and flank pain.    Background: Pt is on Xarelto.     Assessment: Needs to be seen    Protocol Recommended Disposition:   Go To Office Now    Recommendation: Please review and schedule pt if able to fit her in at primary office within disposition.      Routed to triage RN pool at primary clinic    Does the patient meet one of the following criteria for ADS visit consideration? 16+ years old, with an [facility] PCP     TIP  Providers, please consider if this condition is appropriate for management at one of our Acute and Diagnostic Services sites.     If patient is a good candidate, please use dotphrase <dot>triageresponse and select Refer to ADS to document.       Reason for Disposition   Taking Coumadin (warfarin) or other strong blood thinner, or known bleeding disorder (e.g., thrombocytopenia)    Additional Information   Negative: Shock suspected (e.g., cold/pale/clammy skin, too weak to stand, low BP, rapid pulse)   Negative: Sounds like a life-threatening emergency to the triager   Negative: Urinary catheter, questions about   Negative: Recent back or abdominal injury   Negative: Recent genital injury   Negative: Unable to urinate (or only a few drops) > 4 hours and bladder feels very full (e.g., palpable bladder or strong urge to urinate)   Negative: Diffuse (all over) muscle pains in the shoulders, arms, legs, and back and dark (cola or tea-colored) or red-colored urine   Negative: Passing pure blood or large blood clots (i.e., larger than a dime or grape)   Negative: Fever > 100.4 F (38.0 C)   Negative: Patient sounds very sick or  "weak to the triager   Negative: Known sickle cell disease    Answer Assessment - Initial Assessment Questions  1. COLOR of URINE: \"Describe the color of the urine.\"  (e.g., tea-colored, pink, red, bloody) \"Do you have blood clots in your urine?\" (e.g., none, pea, grape, small coin)      Sometimes red, sometimes dark brown, sometimes regular urine color. Clear. No clots  2. ONSET: \"When did the bleeding start?\"       Has been happening over the week, blood sometimes, brown sometimes, sometimes regular urine color.  3. EPISODES: \"How many times has there been blood in the urine?\" or \"How many times today?\"      For the past week, it has been bloody on and off   4. PAIN with URINATION: \"Is there any pain with passing your urine?\" If Yes, ask: \"How bad is the pain?\"  (Scale 1-10; or mild, moderate, severe)     - MILD: Complains slightly about urination hurting.     - MODERATE: Interferes with normal activities.       - SEVERE: Excruciating, unwilling or unable to urinate because of the pain.       Denies  5. FEVER: \"Do you have a fever?\" If Yes, ask: \"What is your temperature, how was it measured, and when did it start?\"      Denies  6. ASSOCIATED SYMPTOMS: \"Are you passing urine more frequently than usual?\"      Denies  7. OTHER SYMPTOMS: \"Do you have any other symptoms?\" (e.g., back/flank pain, abdomen pain, vomiting)      Denies symptom list above  8. PREGNANCY: \"Is there any chance you are pregnant?\" \"When was your last menstrual period?\"      no    Protocols used: Urine - Blood In-A-OH  Jessica Rees RN    "

## 2024-09-26 NOTE — TELEPHONE ENCOUNTER
See PCP's response to the nurse triage on 9/26/24.    Provider Recommendation Follow Up:   Unable to reach patient/caregiver. Left message to return call to [phone]. Upon return call please notify caller of provider's recommendations.         Writer called the patient and left a message to return call.    When the patient calls back, please relay the above provider's message to the patient.    Please route to the RN queue for any questions or concerns.    Jazzy Rodrigues, RN, BSN  [facility]

## 2024-09-26 NOTE — TELEPHONE ENCOUNTER
Cornellr called the patient regarding the nurse triage disposition from 9/26/24.    Cornellr called the patient and relayed to her that there are no appointments left in the [facility] for the rest of the week.    Patient verbalized understanding and stated she is fine with waiting to be seen until next week as she wants to be evaluated by Dr. Jackson specifically.    Cornellr assisted the patient with scheduling an in-clinic appointment, with the PCP, on Thursday, 10/3/24, at 7:40 am.    Cornellr also relayed to the patient that she will send the above information to the PCP to review if it is appropriate for the patient to wait to be seen until 10/3/24.    Only need to call back the patient if the PCP recommends that she be evaluated sooner than 10/3/24.    Jazzy Rodrigues RN, BSN  [facility]

## 2024-09-26 NOTE — TELEPHONE ENCOUNTER
Pt called back, relayed message, stated she understood and had not further questions. Declined to speak to RN

## 2024-09-26 NOTE — TELEPHONE ENCOUNTER
If blood in urine is intermittent and she is not having any other symptoms, can keep appointment with me next week.  If any associated symptoms suggestive of UTI (dysuria, frequency, urgency, fever, general sense of not feeling well) or symptoms of anemia (fatigue, weakness, lightheadedness, palpitations) then she should be seen immediately in urgent care or ER.  If she develops symptoms of bleeding elsewhere, she should be seen more urgently as well.  LUCIAJ

## 2024-10-03 ENCOUNTER — OFFICE VISIT (OUTPATIENT)
Dept: FAMILY MEDICINE | Facility: CLINIC | Age: 84
End: 2024-10-03
Payer: COMMERCIAL

## 2024-10-03 VITALS
SYSTOLIC BLOOD PRESSURE: 120 MMHG | HEIGHT: 68 IN | OXYGEN SATURATION: 99 % | RESPIRATION RATE: 18 BRPM | BODY MASS INDEX: 20.92 KG/M2 | TEMPERATURE: 97.6 F | DIASTOLIC BLOOD PRESSURE: 78 MMHG | WEIGHT: 138 LBS | HEART RATE: 62 BPM

## 2024-10-03 DIAGNOSIS — R31.0 GROSS HEMATURIA: Primary | ICD-10-CM

## 2024-10-03 LAB
ALBUMIN SERPL BCG-MCNC: 4.2 G/DL (ref 3.5–5.2)
ALBUMIN UR-MCNC: >=300 MG/DL
ALP SERPL-CCNC: 49 U/L (ref 40–150)
ALT SERPL W P-5'-P-CCNC: 11 U/L (ref 0–50)
ANION GAP SERPL CALCULATED.3IONS-SCNC: 8 MMOL/L (ref 7–15)
APPEARANCE UR: ABNORMAL
AST SERPL W P-5'-P-CCNC: 24 U/L (ref 0–45)
BACTERIA #/AREA URNS HPF: ABNORMAL /HPF
BILIRUB SERPL-MCNC: 1.3 MG/DL
BILIRUB UR QL STRIP: ABNORMAL
BUN SERPL-MCNC: 14.5 MG/DL (ref 8–23)
CALCIUM SERPL-MCNC: 9.7 MG/DL (ref 8.8–10.4)
CAOX CRY #/AREA URNS HPF: ABNORMAL /HPF
CHLORIDE SERPL-SCNC: 106 MMOL/L (ref 98–107)
COLOR UR AUTO: ABNORMAL
CREAT SERPL-MCNC: 0.89 MG/DL (ref 0.51–0.95)
EGFRCR SERPLBLD CKD-EPI 2021: 65 ML/MIN/1.73M2
ERYTHROCYTE [DISTWIDTH] IN BLOOD BY AUTOMATED COUNT: 13.9 % (ref 10–15)
GLUCOSE SERPL-MCNC: 149 MG/DL (ref 70–99)
GLUCOSE UR STRIP-MCNC: 100 MG/DL
HCO3 SERPL-SCNC: 27 MMOL/L (ref 22–29)
HCT VFR BLD AUTO: 43.9 % (ref 35–47)
HGB BLD-MCNC: 14.7 G/DL (ref 11.7–15.7)
HGB UR QL STRIP: ABNORMAL
KETONES UR STRIP-MCNC: 15 MG/DL
LEUKOCYTE ESTERASE UR QL STRIP: NEGATIVE
MCH RBC QN AUTO: 30.4 PG (ref 26.5–33)
MCHC RBC AUTO-ENTMCNC: 33.5 G/DL (ref 31.5–36.5)
MCV RBC AUTO: 91 FL (ref 78–100)
NITRATE UR QL: NEGATIVE
PH UR STRIP: 5.5 [PH] (ref 5–8)
PLATELET # BLD AUTO: 179 10E3/UL (ref 150–450)
POTASSIUM SERPL-SCNC: 5 MMOL/L (ref 3.4–5.3)
PROT SERPL-MCNC: 7 G/DL (ref 6.4–8.3)
RBC # BLD AUTO: 4.83 10E6/UL (ref 3.8–5.2)
RBC #/AREA URNS AUTO: >100 /HPF
SODIUM SERPL-SCNC: 141 MMOL/L (ref 135–145)
SP GR UR STRIP: >=1.03 (ref 1–1.03)
SQUAMOUS #/AREA URNS AUTO: ABNORMAL /LPF
UROBILINOGEN UR STRIP-ACNC: 1 E.U./DL
WBC # BLD AUTO: 6.3 10E3/UL (ref 4–11)
WBC #/AREA URNS AUTO: ABNORMAL /HPF

## 2024-10-03 PROCEDURE — 85027 COMPLETE CBC AUTOMATED: CPT | Performed by: FAMILY MEDICINE

## 2024-10-03 PROCEDURE — 99213 OFFICE O/P EST LOW 20 MIN: CPT | Performed by: FAMILY MEDICINE

## 2024-10-03 PROCEDURE — 81001 URINALYSIS AUTO W/SCOPE: CPT | Performed by: FAMILY MEDICINE

## 2024-10-03 PROCEDURE — 36415 COLL VENOUS BLD VENIPUNCTURE: CPT | Performed by: FAMILY MEDICINE

## 2024-10-03 PROCEDURE — 80053 COMPREHEN METABOLIC PANEL: CPT | Performed by: FAMILY MEDICINE

## 2024-10-03 ASSESSMENT — PAIN SCALES - GENERAL: PAINLEVEL: NO PAIN (0)

## 2024-10-03 NOTE — PROGRESS NOTES
"  Assessment & Plan     Gross hematuria  This is a new finding.  No obvious etiology by history.  She is taking Xarelto which is likely contributing.  Will await urinalysis today to confirm hematuria, assess for infection.  Will check CBC to assess platelets, will check kidney and liver function to ensure there have been changes suggestive of an underlying renal etiology.  If normal, we will plan on CT and urology referral.  Patient is comfortable with this plan.  - UA Macroscopic with reflex to Microscopic and Culture - Lab Collect  - CBC with platelets; Future  - Comprehensive metabolic panel; Future                Subjective   Yue is a 83 year old, presenting for the following health issues:  UTI (Having blood in urine on and off the past couple weeks. Color of urine has gone back and forth from red to brown)    2 weeks ago developed bright red blood in her urine, sometimes more brown, sometimes urine is normal.  Comes and goes.  No symptoms with it.  No pelvic pain or cramping.  No flank pain.  Denies signs of illness.  Taking Xarelto due to history of atrial fibrillation and tolerating well.  On rare occasions, preps a few times a year, will take Excedrin for a headache, believes onset of her symptoms was around the time she took a dose of Excedrin.  She has not taken any recent antibiotics.  No history of recurrent UTI.  No prior history of kidney stones.    History of Present Illness       Reason for visit:  Blood  Symptom onset:  1-2 weeks ago   She is taking medications regularly.                     Objective    /78   Pulse 62   Temp 97.6  F (36.4  C) (Temporal)   Resp 18   Ht 1.727 m (5' 8\")   Wt 62.6 kg (138 lb)   SpO2 99%   BMI 20.98 kg/m    Body mass index is 20.98 kg/m .  Physical Exam   Alert and pleasant.  Heart with regular rate and rhythm.  Lungs clear.  Abdomen soft and nontender.  No flank pain.            Signed Electronically by: Celeste Jackson MD    "

## 2024-10-03 NOTE — LETTER
October 4, 2024      Yue Gill  [address], [place] [address]        Dear ,    We are writing to inform you of your test results.    Your urine test results confirm blood in your urine as we suspected.  There is no signs of infection as the cause.  Your kidney and liver function continue to look good, and your blood counts are normal.  As we discussed at your visit, the neck step is to have a CT scan to assess for stones or other abnormalities that could cause bleeding, and it also like you to see a urologist in clinic.     Resulted Orders   UA Macroscopic with reflex to Microscopic and Culture - Lab Collect   Result Value Ref Range    Color Urine Brown (A) Colorless, Straw, Light Yellow, Yellow    Appearance Urine Cloudy (A) Clear    Glucose Urine 100 (A) Negative mg/dL    Bilirubin Urine Large (A) Negative    Ketones Urine 15 (A) Negative mg/dL    Specific Gravity Urine >=1.030 1.005 - 1.030    Blood Urine Large (A) Negative    pH Urine 5.5 5.0 - 8.0    Protein Albumin Urine >=300 (A) Negative mg/dL    Urobilinogen Urine 1.0 0.2, 1.0 E.U./dL    Nitrite Urine Negative Negative    Leukocyte Esterase Urine Negative Negative   CBC with platelets   Result Value Ref Range    WBC Count 6.3 4.0 - 11.0 10e3/uL    RBC Count 4.83 3.80 - 5.20 10e6/uL    Hemoglobin 14.7 11.7 - 15.7 g/dL    Hematocrit 43.9 35.0 - 47.0 %    MCV 91 78 - 100 fL    MCH 30.4 26.5 - 33.0 pg    MCHC 33.5 31.5 - 36.5 g/dL    RDW 13.9 10.0 - 15.0 %    Platelet Count 179 150 - 450 10e3/uL   Comprehensive metabolic panel   Result Value Ref Range    Sodium 141 135 - 145 mmol/L    Potassium 5.0 3.4 - 5.3 mmol/L    Carbon Dioxide (CO2) 27 22 - 29 mmol/L    Anion Gap 8 7 - 15 mmol/L    Urea Nitrogen 14.5 8.0 - 23.0 mg/dL    Creatinine 0.89 0.51 - 0.95 mg/dL    GFR Estimate 65 >60 mL/min/1.73m2      Comment:      eGFR calculated using 2021 CKD-EPI equation.    Calcium 9.7 8.8 - 10.4 mg/dL      Comment:      Reference intervals for this  test were updated on 7/16/2024 to reflect our healthy population more accurately. There may be differences in the flagging of prior results with similar values performed with this method. Those prior results can be interpreted in the context of the updated reference intervals.    Chloride 106 98 - 107 mmol/L    Glucose 149 (H) 70 - 99 mg/dL    Alkaline Phosphatase 49 40 - 150 U/L    AST 24 0 - 45 U/L    ALT 11 0 - 50 U/L    Protein Total 7.0 6.4 - 8.3 g/dL    Albumin 4.2 3.5 - 5.2 g/dL    Bilirubin Total 1.3 (H) <=1.2 mg/dL   UA Microscopic with Reflex to Culture   Result Value Ref Range    Bacteria Urine Moderate (A) None Seen /HPF    RBC Urine >100 (A) 0-2 /HPF /HPF    WBC Urine None Seen 0-5 /HPF /HPF    Squamous Epithelials Urine Few (A) None Seen /LPF    Calcium Oxalate Crystals Urine Few (A) None Seen /HPF    Narrative    Urine Culture not indicated       If you have any questions or concerns, please call the clinic at the number listed above.       Sincerely,      Celeste Jackson MD

## 2024-10-03 NOTE — PATIENT INSTRUCTIONS
Today we will check your urine to confirm the presence of blood and to look for other signs of infection.  We will also check kidney and liver function and blood counts today.  If you do not have an infection, anticipate that we will schedule you for a CT scan to look for kidney stones and a visit with urology to assess your bladder for sources of bleeding.

## 2024-10-04 DIAGNOSIS — R31.0 GROSS HEMATURIA: Primary | ICD-10-CM

## 2024-10-04 NOTE — RESULT ENCOUNTER NOTE
Your urine test results confirm blood in your urine as we suspected.  There is no signs of infection as the cause.  Your kidney and liver function continue to look good, and your blood counts are normal.  As we discussed at your visit, the neck step is to have a CT scan to assess for stones or other abnormalities that could cause bleeding, and it also like you to see a urologist in clinic.  I have placed orders for both, the scheduling team will contact you.

## 2025-04-09 ENCOUNTER — PATIENT OUTREACH (OUTPATIENT)
Dept: CARE COORDINATION | Facility: CLINIC | Age: 85
End: 2025-04-09
Payer: COMMERCIAL

## 2025-04-23 ENCOUNTER — PATIENT OUTREACH (OUTPATIENT)
Dept: CARE COORDINATION | Facility: CLINIC | Age: 85
End: 2025-04-23
Payer: COMMERCIAL

## 2025-06-24 DIAGNOSIS — M81.0 SENILE OSTEOPOROSIS: ICD-10-CM

## 2025-06-24 RX ORDER — ALENDRONATE SODIUM 70 MG/1
70 TABLET ORAL
Qty: 12 TABLET | Refills: 5 | Status: SHIPPED | OUTPATIENT
Start: 2025-06-24

## 2025-07-09 DIAGNOSIS — I48.0 PAROXYSMAL ATRIAL FIBRILLATION (H): ICD-10-CM

## 2025-07-09 RX ORDER — METOPROLOL SUCCINATE 200 MG/1
200 TABLET, EXTENDED RELEASE ORAL DAILY
Qty: 90 TABLET | Refills: 1 | Status: SHIPPED | OUTPATIENT
Start: 2025-07-09

## 2025-07-14 NOTE — PROGRESS NOTES
[facility]  Cardiac Electrophysiology  [address]  [place] [address]   Office: [phone]  Fax: [phone]     HEART CARE ELECTROPHYSIOLOGY FOLLOW UP    Primary Care: Celeste Jackson MD      Assessment/Recommendations     Permanent atrial fibrillation/stage 4: With last documented sinus rhythm 2016.  Appropriately controlled ventricular response on metoprolol    [id number] score of 4 for age >75, gender, HTN; HAS BLED 1 for age >65. She denies bleeding complications     HTN: controlled on beta blocker    RBBB    Plan:  Continue Xarelto 15 mg daily with dinner for stroke prophylaxis for creatinine clearance <51  Continue metoprolol succinate 200 mg daily  Follow-up 1 year       History of Present Illness/Subjective    Hector EMILIANO Gill is a 84 year old female with past medical history significant for permanent atrial fibrillation, RBBB, HTN, dyslipidemia, seen today for annual follow-up.  She has been under rate control with metoprolol for the past decade, with last documented sinus rhythm in 2016. Her health has been stable over the past year. She walks for exercise without any problems.  She had multiple occasions of gross hematuria last year which resolved spontaneously and have not recurred. She continues to spend two months a year in [place] with her  who is also in good health. She denies chest discomfort, palpitations, shortness of breath, lightheadedness/dizziness, pedal edema, or syncope.       Data Review     EKG 5/24/2021:  bpm, RBBB  ms, QT/QTc 358/501 ms  Personally reviewed.     TTE 6/8/2023  1. The left ventricle is normal in size. Left ventricular function is  normal.The ejection fraction is 60-65%. There is normal left ventricular wall  thickness. No regional wall motion abnormalities noted.  2. Normal right ventricle size and systolic function.  3. The left atrium is severely dilated.  4. There is moderate mitral annular  "calcification. There is mild (1+) mitral  regurgitation. There is no mitral valve stenosis.  5. There is mild (1+) aortic regurgitation.  6. The right ventricular systolic pressure is approximated at 36mmHg plus the  right atrial pressure. RA pressure of 8 mmHg.    7/10/2024  Rhythm: Continuous atrial fibrillation with controlled ventricular response.  Conduction: IVCD is present but no high degree AV block.  Arrhythmia: Modest increase in ventricular ectopy (asymptomatic)  Symptoms: None reported.    I have reviewed and updated the patient's past medical history, allergy list and medication list.          Physical Examination   BMI= Body mass index is 20.71 kg/m .    Wt Readings from Last 3 Encounters:   07/15/25 61.8 kg (136 lb 3.2 oz)   10/03/24 62.6 kg (138 lb)   07/01/24 64.5 kg (142 lb 4.8 oz)       Vitals: BP (!) 162/88 (BP Location: Right arm, Patient Position: Sitting, Cuff Size: Adult Regular)   Pulse 93   Resp 16   Ht 1.727 m (5' 8\")   Wt 61.8 kg (136 lb 3.2 oz)   BMI 20.71 kg/m    General   Appearance:   Alert and oriented, in no acute distress   HEENT:  Normocephalic and atraumatic   Neck: No JVP, carotid bruit or obvious thyromegaly   Lungs:   Respirations unlabored   Cardiovascular:   Rhythm is irregular. S1 and S2 are normal. No significant murmur is present. Lower extremities demonstrate no significant edema   Extremities: No cyanosis or clubbing   Skin: Skin is warm, dry, and otherwise intact   Neurologic: Gait not assessed. Mood and affect appropriate           Medical History  Surgical History Family History Social History   Past Medical History:   Diagnosis Date    Inverted nipple     Past Surgical History:   Procedure Laterality Date    HC REMOVAL OF TONSILS,<11 Y/O      Description: Tonsillectomy;  Recorded: 12/18/2007;    [facility] APPENDECTOMY      Description: Appendectomy;  Recorded: 12/18/2007;    Family History   Problem Relation Age of Onset    Hypertension Father     Breast Cancer No " family hx of     Social History     Socioeconomic History    Marital status:      Spouse name: Not on file    Number of children: Not on file    Years of education: Not on file    Highest education level: Not on file   Occupational History    Not on file   Tobacco Use    Smoking status: Former    Smokeless tobacco: Never   Vaping Use    Vaping status: Never Used   Substance and Sexual Activity    Alcohol use: Not on file    Drug use: Not on file    Sexual activity: Not on file   Other Topics Concern    Not on file   Social History Narrative    Not on file     Social Drivers of Health     Financial Resource Strain: Low Risk  (5/9/2024)    Financial Resource Strain     Within the past 12 months, have you or your family members you live with been unable to get utilities (heat, electricity) when it was really needed?: No   Food Insecurity: Low Risk  (5/9/2024)    Food Insecurity     Within the past 12 months, did you worry that your food would run out before you got money to buy more?: No     Within the past 12 months, did the food you bought just not last and you didn t have money to get more?: No   Transportation Needs: Low Risk  (5/9/2024)    Transportation Needs     Within the past 12 months, has lack of transportation kept you from medical appointments, getting your medicines, non-medical meetings or appointments, work, or from getting things that you need?: No   Physical Activity: Unknown (5/9/2024)    Exercise Vital Sign     Days of Exercise per Week: 3 days     Minutes of Exercise per Session: Not on file   Stress: Stress Concern Present (5/9/2024)    [employer] - Occupational Stress Questionnaire     Feeling of Stress : To some extent   Social Connections: Unknown (5/9/2024)    Social Connection and Isolation Panel [NHANES]     Frequency of Communication with Friends and Family: Not on file     Frequency of Social Gatherings with Friends and Family: More than three times a  week     Attends Zoroastrianism Services: Not on file     Active Member of Clubs or Organizations: Not on file     Attends Club or Organization Meetings: Not on file     Marital Status: Not on file   Interpersonal Safety: Low Risk  (5/9/2024)    Interpersonal Safety     Do you feel physically and emotionally safe where you currently live?: Yes     Within the past 12 months, have you been hit, slapped, kicked or otherwise physically hurt by someone?: No     Within the past 12 months, have you been humiliated or emotionally abused in other ways by your partner or ex-partner?: No   Housing Stability: Low Risk  (5/9/2024)    Housing Stability     Do you have housing? : Yes     Are you worried about losing your housing?: No          Medications  Allergies   Scheduled Meds:  Current Outpatient Medications   Medication Sig Dispense Refill    alendronate (FOSAMAX) 70 MG tablet TAKE 1 TABLET BY MOUTH EVERY 7 DAYS 12 tablet 5    metoprolol succinate ER (TOPROL XL) 200 MG 24 hr tablet TAKE 1 TABLET BY MOUTH EVERY DAY 90 tablet 1    multivitamin with minerals (THERA-M) 9 mg iron-400 mcg Tab tablet [MULTIVITAMIN WITH MINERALS (THERA-M) 9 MG IRON-400 MCG TAB TABLET] Take 1 tablet by mouth daily.      rivaroxaban ANTICOAGULANT (XARELTO ANTICOAGULANT) 15 MG TABS tablet Take 1 tablet (15 mg) by mouth daily (with dinner) 90 tablet 3    Allergies   Allergen Reactions    Seasonal Allergies      Hay, and fever         Lab Results    Chemistry/lipid CBC Cardiac Enzymes/BNP/TSH/INR   Lab Results   Component Value Date    CHOL 206 (H) 05/09/2024    HDL 78 05/09/2024    TRIG 75 05/09/2024    BUN 14.5 10/03/2024     10/03/2024    CO2 27 10/03/2024    Lab Results   Component Value Date    WBC 6.3 10/03/2024    HGB 14.7 10/03/2024    HCT 43.9 10/03/2024    MCV 91 10/03/2024     10/03/2024    @RESUFAST(BMP,CBC,BNP,TSH,  INR)@      25 minutes spent reviewing prior records (including documentation, laboratory studies, cardiac  testing/imaging), history and physical exam, planning, and subsequent documentation.     The longitudinal plan of care for the diagnosis(es)/condition(s) as documented were addressed during this visit. Due to the added complexity in care, I will continue to support Yue in the subsequent management and with ongoing continuity of care.      This note has been dictated using voice recognition software. Any grammatical, typographical, or context distortions are unintentional and inherent to the software.    Rhina Gonzalez CNP  Cardiac Electrophysiology  [facility] [place]  Clinic and [phone]  Fax: [phone]  Electrophysiology Nurses: [phone]

## 2025-07-15 ENCOUNTER — OFFICE VISIT (OUTPATIENT)
Dept: URGENT CARE | Facility: URGENT CARE | Age: 85
End: 2025-07-15
Payer: COMMERCIAL

## 2025-07-15 ENCOUNTER — OFFICE VISIT (OUTPATIENT)
Dept: CARDIOLOGY | Facility: CLINIC | Age: 85
End: 2025-07-15
Payer: COMMERCIAL

## 2025-07-15 VITALS
HEIGHT: 68 IN | SYSTOLIC BLOOD PRESSURE: 148 MMHG | BODY MASS INDEX: 20.64 KG/M2 | DIASTOLIC BLOOD PRESSURE: 72 MMHG | WEIGHT: 136.2 LBS | RESPIRATION RATE: 16 BRPM | HEART RATE: 93 BPM

## 2025-07-15 VITALS
HEIGHT: 68 IN | TEMPERATURE: 97.9 F | BODY MASS INDEX: 20.64 KG/M2 | OXYGEN SATURATION: 97 % | RESPIRATION RATE: 18 BRPM | HEART RATE: 94 BPM | WEIGHT: 136.2 LBS

## 2025-07-15 DIAGNOSIS — R39.89 URINARY PROBLEM: ICD-10-CM

## 2025-07-15 DIAGNOSIS — I48.21 PERMANENT ATRIAL FIBRILLATION (H): Primary | ICD-10-CM

## 2025-07-15 DIAGNOSIS — I10 ESSENTIAL HYPERTENSION: ICD-10-CM

## 2025-07-15 DIAGNOSIS — R30.0 DYSURIA: Primary | ICD-10-CM

## 2025-07-15 DIAGNOSIS — I48.0 PAROXYSMAL ATRIAL FIBRILLATION (H): ICD-10-CM

## 2025-07-15 LAB
ALBUMIN UR-MCNC: ABNORMAL MG/DL
APPEARANCE UR: CLEAR
BACTERIA #/AREA URNS HPF: ABNORMAL /HPF
BILIRUB UR QL STRIP: NEGATIVE
CAOX CRY #/AREA URNS HPF: ABNORMAL /HPF
COLOR UR AUTO: YELLOW
GLUCOSE UR STRIP-MCNC: NEGATIVE MG/DL
HGB UR QL STRIP: ABNORMAL
KETONES UR STRIP-MCNC: NEGATIVE MG/DL
LEUKOCYTE ESTERASE UR QL STRIP: ABNORMAL
NITRATE UR QL: NEGATIVE
PH UR STRIP: 6 [PH] (ref 5–8)
RBC #/AREA URNS AUTO: ABNORMAL /HPF
SP GR UR STRIP: 1.02 (ref 1–1.03)
SQUAMOUS #/AREA URNS AUTO: ABNORMAL /LPF
UROBILINOGEN UR STRIP-ACNC: 0.2 E.U./DL
WBC #/AREA URNS AUTO: ABNORMAL /HPF

## 2025-07-15 PROCEDURE — 87088 URINE BACTERIA CULTURE: CPT | Performed by: PHYSICIAN ASSISTANT

## 2025-07-15 PROCEDURE — 81001 URINALYSIS AUTO W/SCOPE: CPT | Performed by: PHYSICIAN ASSISTANT

## 2025-07-15 PROCEDURE — 87086 URINE CULTURE/COLONY COUNT: CPT | Performed by: PHYSICIAN ASSISTANT

## 2025-07-15 PROCEDURE — 99213 OFFICE O/P EST LOW 20 MIN: CPT | Performed by: NURSE PRACTITIONER

## 2025-07-15 PROCEDURE — G2211 COMPLEX E/M VISIT ADD ON: HCPCS | Performed by: NURSE PRACTITIONER

## 2025-07-15 PROCEDURE — 3078F DIAST BP <80 MM HG: CPT | Performed by: NURSE PRACTITIONER

## 2025-07-15 PROCEDURE — 3077F SYST BP >= 140 MM HG: CPT | Performed by: NURSE PRACTITIONER

## 2025-07-15 PROCEDURE — 99214 OFFICE O/P EST MOD 30 MIN: CPT | Performed by: PHYSICIAN ASSISTANT

## 2025-07-15 RX ORDER — CEFDINIR 300 MG/1
300 CAPSULE ORAL 2 TIMES DAILY
Qty: 14 CAPSULE | Refills: 0 | Status: SHIPPED | OUTPATIENT
Start: 2025-07-15 | End: 2025-07-22

## 2025-07-15 RX ORDER — METOPROLOL SUCCINATE 200 MG/1
200 TABLET, EXTENDED RELEASE ORAL DAILY
Qty: 90 TABLET | Refills: 3 | Status: SHIPPED | OUTPATIENT
Start: 2025-07-15

## 2025-07-15 NOTE — PROGRESS NOTES
"Patient presents with:  Urinary Problem: Burning sensation frequency         Clinical Decision Making:  Multiple etiologies and diagnoses were considered to include but not limited to urinary tract infection, dysuria, hyperglycemia.  Patient is treated for a long course of treatment based on her age and other inclusion criteria.  Urine is sent for culture.  Patient is treated with antibiotic and creatinine clearance and allergies were reviewed and expected course of resolution and indication for return was gone over.        ICD-10-CM    1. Dysuria  R30.0 cefdinir (OMNICEF) 300 MG capsule      2. Urinary problem  R39.89 UA Macroscopic with reflex to Microscopic and Culture - Clinic Collect     UA Microscopic with Reflex to Culture     Urine Culture          HPI:  Hector Gill is a 84 year old female who presents today for a  one week history of irritative voiding symptoms to include urinary hesitancy urgency frequency and dysuria.  Patient denies gross hematuria.  Denies fever chills night sweats fatigue or other red flag symptoms to include back or flank pain and no vaginal discharge.  She has had a previous urinary tract infections does feel similar to how her other symptoms have been.  She has used over-the-counter Pyridium for treatment of her symptoms.    History obtained from chart review and the patient.    Problem List:  2023-05: Cardiomyopathy, unspecified type (H)  2017-05: Paroxysmal atrial fibrillation (H)  Hyperlipidemia  Impaired Fasting Glucose  Anxiety  Postmenopausal Osteoporosis      Review of Systems  As above in HPI otherwise negative.    Vitals:    07/15/25 1132   Pulse: 94   Resp: 18   Temp: 97.9  F (36.6  C)   TempSrc: Oral   SpO2: 97%   Weight: 61.8 kg (136 lb 3.2 oz)   Height: 1.727 m (5' 8\")       General: Patient is resting comfortably no acute distress is afebrile  HEENT: Head is normocephalic atraumatic   eyes are PERRL EOMI sclera anicteric   Abdomen: Soft nontender nondistended no " suprapubic tenderness to palpation or induration no CVA tenderness to percussion.  Skin: Without rash non-diaphoretic    Physical Exam      Labs:  Results for orders placed or performed in visit on 07/15/25   UA Macroscopic with reflex to Microscopic and Culture - Clinic Collect     Status: Abnormal    Specimen: Urine, Clean Catch   Result Value Ref Range    Color Urine Yellow Colorless, Straw, Light Yellow, Yellow    Appearance Urine Clear Clear    Glucose Urine Negative Negative mg/dL    Bilirubin Urine Negative Negative    Ketones Urine Negative Negative mg/dL    Specific Gravity Urine 1.025 1.005 - 1.030    Blood Urine Large (A) Negative    pH Urine 6.0 5.0 - 8.0    Protein Albumin Urine Trace (A) Negative mg/dL    Urobilinogen Urine 0.2 0.2, 1.0 E.U./dL    Nitrite Urine Negative Negative    Leukocyte Esterase Urine Small (A) Negative   UA Microscopic with Reflex to Culture     Status: Abnormal   Result Value Ref Range    Bacteria Urine Few (A) None Seen /HPF    RBC Urine 25-50 (A) 0-2 /HPF /HPF    WBC Urine 10-25 (A) 0-5 /HPF /HPF    Squamous Epithelials Urine Few (A) None Seen /LPF    Calcium Oxalate Crystals Urine Few (A) None Seen /HPF    Narrative    Microscopic examination was performed on unspun specimen due to QNS       Urine is sent for culture.      At the end of the encounter, I discussed results, diagnosis, medications. Discussed red flags for immediate return to clinic/ER, as well as indications for follow up if no improvement. Patient understood and agreed to plan. Patient was stable for discharge.

## 2025-07-15 NOTE — PROGRESS NOTES
Urgent Care Clinic Visit    Chief Complaint   Patient presents with    Urinary Problem     Burning sensation frequency

## 2025-07-15 NOTE — LETTER
7/15/2025    Celeste Jackson MD  [address]  [facility] [address]    RE: Hector Gill       Dear Colleague,     I had the pleasure of seeing Hector Gill in the [facility].     [facility]  Cardiac Electrophysiology  [address]  [place] [address]   Office: [phone]  Fax: [phone]     HEART CARE ELECTROPHYSIOLOGY FOLLOW UP    Primary Care: Celeste Jackson MD      Assessment/Recommendations     Permanent atrial fibrillation/stage 4: With last documented sinus rhythm 2016.  Appropriately controlled ventricular response on metoprolol    [id number] score of 4 for age >75, gender, HTN; HAS BLED 1 for age >65. She denies bleeding complications     HTN: controlled on beta blocker    RBBB    Plan:  Continue Xarelto 15 mg daily with dinner for stroke prophylaxis for creatinine clearance <51  Continue metoprolol succinate 200 mg daily  Follow-up 1 year       History of Present Illness/Subjective    Hector Gill is a 84 year old female with past medical history significant for permanent atrial fibrillation, RBBB, HTN, dyslipidemia, seen today for annual follow-up.  She has been under rate control with metoprolol for the past decade, with last documented sinus rhythm in 2016. Her health has been stable over the past year. She walks for exercise without any problems.  She had multiple occasions of gross hematuria last year which resolved spontaneously and have not recurred. She continues to spend two months a year in [place] with her  who is also in good health. She denies chest discomfort, palpitations, shortness of breath, lightheadedness/dizziness, pedal edema, or syncope.       Data Review     EKG 5/24/2021:  bpm, RBBB  ms, QT/QTc 358/501 ms  Personally reviewed.     TTE 6/8/2023  1. The left ventricle is normal in size. Left ventricular function is  normal.The ejection fraction is 60-65%. There is normal  "left ventricular wall  thickness. No regional wall motion abnormalities noted.  2. Normal right ventricle size and systolic function.  3. The left atrium is severely dilated.  4. There is moderate mitral annular calcification. There is mild (1+) mitral  regurgitation. There is no mitral valve stenosis.  5. There is mild (1+) aortic regurgitation.  6. The right ventricular systolic pressure is approximated at 36mmHg plus the  right atrial pressure. RA pressure of 8 mmHg.    7/10/2024  Rhythm: Continuous atrial fibrillation with controlled ventricular response.  Conduction: IVCD is present but no high degree AV block.  Arrhythmia: Modest increase in ventricular ectopy (asymptomatic)  Symptoms: None reported.    I have reviewed and updated the patient's past medical history, allergy list and medication list.          Physical Examination   BMI= Body mass index is 20.71 kg/m .    Wt Readings from Last 3 Encounters:   07/15/25 61.8 kg (136 lb 3.2 oz)   10/03/24 62.6 kg (138 lb)   07/01/24 64.5 kg (142 lb 4.8 oz)       Vitals: BP (!) 162/88 (BP Location: Right arm, Patient Position: Sitting, Cuff Size: Adult Regular)   Pulse 93   Resp 16   Ht 1.727 m (5' 8\")   Wt 61.8 kg (136 lb 3.2 oz)   BMI 20.71 kg/m    General   Appearance:   Alert and oriented, in no acute distress   HEENT:  Normocephalic and atraumatic   Neck: No JVP, carotid bruit or obvious thyromegaly   Lungs:   Respirations unlabored   Cardiovascular:   Rhythm is irregular. S1 and S2 are normal. No significant murmur is present. Lower extremities demonstrate no significant edema   Extremities: No cyanosis or clubbing   Skin: Skin is warm, dry, and otherwise intact   Neurologic: Gait not assessed. Mood and affect appropriate           Medical History  Surgical History Family History Social History   Past Medical History:   Diagnosis Date     Inverted nipple     Past Surgical History:   Procedure Laterality Date     HC REMOVAL OF TONSILS,<11 Y/O      " Description: Tonsillectomy;  Recorded: 12/18/2007;     ZZC APPENDECTOMY      Description: Appendectomy;  Recorded: 12/18/2007;    Family History   Problem Relation Age of Onset     Hypertension Father      Breast Cancer No family hx of     Social History     Socioeconomic History     Marital status:      Spouse name: Not on file     Number of children: Not on file     Years of education: Not on file     Highest education level: Not on file   Occupational History     Not on file   Tobacco Use     Smoking status: Former     Smokeless tobacco: Never   Vaping Use     Vaping status: Never Used   Substance and Sexual Activity     Alcohol use: Not on file     Drug use: Not on file     Sexual activity: Not on file   Other Topics Concern     Not on file   Social History Narrative     Not on file     Social Drivers of Health     Financial Resource Strain: Low Risk  (5/9/2024)    Financial Resource Strain      Within the past 12 months, have you or your family members you live with been unable to get utilities (heat, electricity) when it was really needed?: No   Food Insecurity: Low Risk  (5/9/2024)    Food Insecurity      Within the past 12 months, did you worry that your food would run out before you got money to buy more?: No      Within the past 12 months, did the food you bought just not last and you didn t have money to get more?: No   Transportation Needs: Low Risk  (5/9/2024)    Transportation Needs      Within the past 12 months, has lack of transportation kept you from medical appointments, getting your medicines, non-medical meetings or appointments, work, or from getting things that you need?: No   Physical Activity: Unknown (5/9/2024)    Exercise Vital Sign      Days of Exercise per Week: 3 days      Minutes of Exercise per Session: Not on file   Stress: Stress Concern Present (5/9/2024)    [place] [employer] - Occupational Stress Questionnaire      Feeling of Stress : To some extent    Social Connections: Unknown (5/9/2024)    Social Connection and Isolation Panel [NHANES]      Frequency of Communication with Friends and Family: Not on file      Frequency of Social Gatherings with Friends and Family: More than three times a week      Attends Baptism Services: Not on file      Active Member of Clubs or Organizations: Not on file      Attends Club or Organization Meetings: Not on file      Marital Status: Not on file   Interpersonal Safety: Low Risk  (5/9/2024)    Interpersonal Safety      Do you feel physically and emotionally safe where you currently live?: Yes      Within the past 12 months, have you been hit, slapped, kicked or otherwise physically hurt by someone?: No      Within the past 12 months, have you been humiliated or emotionally abused in other ways by your partner or ex-partner?: No   Housing Stability: Low Risk  (5/9/2024)    Housing Stability      Do you have housing? : Yes      Are you worried about losing your housing?: No          Medications  Allergies   Scheduled Meds:  Current Outpatient Medications   Medication Sig Dispense Refill     alendronate (FOSAMAX) 70 MG tablet TAKE 1 TABLET BY MOUTH EVERY 7 DAYS 12 tablet 5     metoprolol succinate ER (TOPROL XL) 200 MG 24 hr tablet TAKE 1 TABLET BY MOUTH EVERY DAY 90 tablet 1     multivitamin with minerals (THERA-M) 9 mg iron-400 mcg Tab tablet [MULTIVITAMIN WITH MINERALS (THERA-M) 9 MG IRON-400 MCG TAB TABLET] Take 1 tablet by mouth daily.       rivaroxaban ANTICOAGULANT (XARELTO ANTICOAGULANT) 15 MG TABS tablet Take 1 tablet (15 mg) by mouth daily (with dinner) 90 tablet 3    Allergies   Allergen Reactions     Seasonal Allergies      Hay, and fever         Lab Results    Chemistry/lipid CBC Cardiac Enzymes/BNP/TSH/INR   Lab Results   Component Value Date    CHOL 206 (H) 05/09/2024    HDL 78 05/09/2024    TRIG 75 05/09/2024    BUN 14.5 10/03/2024     10/03/2024    CO2 27 10/03/2024    Lab Results   Component Value Date     WBC 6.3 10/03/2024    HGB 14.7 10/03/2024    HCT 43.9 10/03/2024    MCV 91 10/03/2024     10/03/2024    @RESUFAST(BMP,CBC,BNP,TSH,  INR)@      25 minutes spent reviewing prior records (including documentation, laboratory studies, cardiac testing/imaging), history and physical exam, planning, and subsequent documentation.     The longitudinal plan of care for the diagnosis(es)/condition(s) as documented were addressed during this visit. Due to the added complexity in care, I will continue to support Yue in the subsequent management and with ongoing continuity of care.      This note has been dictated using voice recognition software. Any grammatical, typographical, or context distortions are unintentional and inherent to the software.    Rhina Gonzalez CNP  Cardiac Electrophysiology  [facility]  Clinic and [phone]  Fax: [phone]  Electrophysiology Nurses: [phone]        Thank you for allowing me to participate in the care of your patient.      Sincerely,     ANISA Turner CNP     [facility]  cc:   Anirudh Curiel MD  [address]  [place] [address]

## 2025-07-16 LAB — BACTERIA UR CULT: ABNORMAL

## 2025-08-14 ENCOUNTER — OFFICE VISIT (OUTPATIENT)
Dept: URGENT CARE | Facility: URGENT CARE | Age: 85
End: 2025-08-14
Payer: COMMERCIAL

## 2025-08-14 VITALS
SYSTOLIC BLOOD PRESSURE: 170 MMHG | WEIGHT: 136 LBS | DIASTOLIC BLOOD PRESSURE: 101 MMHG | OXYGEN SATURATION: 95 % | RESPIRATION RATE: 16 BRPM | HEART RATE: 81 BPM | HEIGHT: 68 IN | TEMPERATURE: 97.7 F | BODY MASS INDEX: 20.61 KG/M2

## 2025-08-14 DIAGNOSIS — H91.93 BILATERAL HEARING LOSS, UNSPECIFIED HEARING LOSS TYPE: ICD-10-CM

## 2025-08-14 DIAGNOSIS — H61.23 BILATERAL IMPACTED CERUMEN: Primary | ICD-10-CM
